# Patient Record
Sex: MALE | Race: BLACK OR AFRICAN AMERICAN | NOT HISPANIC OR LATINO | URBAN - METROPOLITAN AREA
[De-identification: names, ages, dates, MRNs, and addresses within clinical notes are randomized per-mention and may not be internally consistent; named-entity substitution may affect disease eponyms.]

---

## 2017-02-20 ENCOUNTER — EMERGENCY (EMERGENCY)
Facility: HOSPITAL | Age: 82
LOS: 1 days | Discharge: HOME | End: 2017-02-20
Attending: EMERGENCY MEDICINE | Admitting: EMERGENCY MEDICINE
Payer: MEDICARE

## 2017-02-20 VITALS
SYSTOLIC BLOOD PRESSURE: 167 MMHG | TEMPERATURE: 98 F | HEART RATE: 68 BPM | DIASTOLIC BLOOD PRESSURE: 68 MMHG | OXYGEN SATURATION: 97 % | RESPIRATION RATE: 20 BRPM

## 2017-02-20 DIAGNOSIS — S31.010A LACERATION WITHOUT FOREIGN BODY OF LOWER BACK AND PELVIS WITHOUT PENETRATION INTO RETROPERITONEUM, INITIAL ENCOUNTER: ICD-10-CM

## 2017-02-20 DIAGNOSIS — S31.000A UNSPECIFIED OPEN WOUND OF LOWER BACK AND PELVIS WITHOUT PENETRATION INTO RETROPERITONEUM, INITIAL ENCOUNTER: ICD-10-CM

## 2017-02-20 DIAGNOSIS — I10 ESSENTIAL (PRIMARY) HYPERTENSION: ICD-10-CM

## 2017-02-20 DIAGNOSIS — E78.5 HYPERLIPIDEMIA, UNSPECIFIED: ICD-10-CM

## 2017-02-20 DIAGNOSIS — E11.9 TYPE 2 DIABETES MELLITUS WITHOUT COMPLICATIONS: ICD-10-CM

## 2017-02-20 PROCEDURE — 99283 EMERGENCY DEPT VISIT LOW MDM: CPT

## 2017-02-20 NOTE — ED PROCEDURE NOTE - PROCEDURE ADDITIONAL DETAILS
3cc 1% lido with epi injected around skin tag and removed with scissors. Dressed with xeroform and guaze. Justus Quintanilla, Resident. 3cc 1% lido with epi injected around excess skin and removed with scissors. Dressed with xeroform and guaze. Justus Quintanilla, Resident.

## 2017-02-20 NOTE — ED PROVIDER NOTE - ATTENDING CONTRIBUTION TO CARE
81 yom pw laceration to lower back, after accidental tearing off of skin tag.  no other complaints.      agree w/ resident, nad, no sign of cellulitis on exam or evidence of abscess, will lac repair. 81 yom pw laceration to lower back, after accidental tearing off of skin tag.  no other complaints.      agree w/ resident, nad, no sign of cellulitis on exam or evidence of abscess, excess skin noted w/ lac, will lac repair. 81 yom pw bleeding from lower back, after accidental tearing off of skin tag.  no other complaints.      agree w/ resident, nad, no sign of cellulitis on exam or evidence of abscess, excess skin noted, will likely remove excess skin w/ wound care

## 2017-02-20 NOTE — ED PROVIDER NOTE - MEDICAL DECISION MAKING DETAILS
81M presents with avulsion of mole while placing pants on today. Hemostatic. Asymptomatic. PE s/f ctab, rrr, 1.5cm avulsion area from mole with partial attachment to skin. Will remove and dress. Justus Quintanilla, Resident.

## 2017-02-20 NOTE — ED ADULT TRIAGE NOTE - CHIEF COMPLAINT QUOTE
Patient has mole that came off from lower back while putting his pants on. Small cut noted. Not actively bleeding.

## 2017-02-20 NOTE — ED PROVIDER NOTE - OBJECTIVE STATEMENT
81M history of lung and prostate CA, HTN/HLD, DM presents with bleeding from mole that tore off while he was putting his pants today. Denies chest pain, shortness of breath, abdominal pain, nausea/vomiting, fevers/chills, bowel/bladder changes, AC use.

## 2017-07-06 PROBLEM — Z00.00 ENCOUNTER FOR PREVENTIVE HEALTH EXAMINATION: Status: ACTIVE | Noted: 2017-07-06

## 2017-07-13 ENCOUNTER — OUTPATIENT (OUTPATIENT)
Dept: OUTPATIENT SERVICES | Facility: HOSPITAL | Age: 82
LOS: 1 days | End: 2017-07-13

## 2017-07-13 ENCOUNTER — APPOINTMENT (OUTPATIENT)
Dept: MRI IMAGING | Facility: CLINIC | Age: 82
End: 2017-07-13

## 2018-03-05 ENCOUNTER — EMERGENCY (EMERGENCY)
Facility: HOSPITAL | Age: 83
LOS: 1 days | Discharge: HOME | End: 2018-03-05
Attending: EMERGENCY MEDICINE | Admitting: EMERGENCY MEDICINE
Payer: MEDICARE

## 2018-03-05 VITALS
HEART RATE: 97 BPM | OXYGEN SATURATION: 99 % | TEMPERATURE: 97 F | SYSTOLIC BLOOD PRESSURE: 156 MMHG | RESPIRATION RATE: 18 BRPM | WEIGHT: 151.9 LBS | DIASTOLIC BLOOD PRESSURE: 52 MMHG

## 2018-03-05 PROCEDURE — 71046 X-RAY EXAM CHEST 2 VIEWS: CPT | Mod: 26

## 2018-03-05 PROCEDURE — 99284 EMERGENCY DEPT VISIT MOD MDM: CPT

## 2018-03-05 RX ORDER — IPRATROPIUM/ALBUTEROL SULFATE 18-103MCG
3 AEROSOL WITH ADAPTER (GRAM) INHALATION
Qty: 0 | Refills: 0 | Status: COMPLETED | OUTPATIENT
Start: 2018-03-05 | End: 2018-03-05

## 2018-03-05 RX ORDER — AZITHROMYCIN 500 MG/1
1 TABLET, FILM COATED ORAL
Qty: 3 | Refills: 0
Start: 2018-03-05 | End: 2018-03-07

## 2018-03-05 RX ORDER — ALBUTEROL 90 UG/1
2 AEROSOL, METERED ORAL
Qty: 1 | Refills: 0
Start: 2018-03-05 | End: 2018-04-03

## 2018-03-05 RX ADMIN — Medication 3 MILLILITER(S): at 15:57

## 2018-03-05 RX ADMIN — Medication 60 MILLIGRAM(S): at 15:15

## 2018-03-05 RX ADMIN — Medication 3 MILLILITER(S): at 15:15

## 2018-03-05 NOTE — ED PROVIDER NOTE - OBJECTIVE STATEMENT
Pt is an 81 y/o M, with PMHx of prostate CA, presenting to the ED with c/o cough, onset three days. Pt reports productive cough with white sputum production. Denies associated F/C, SOB, CP, and back pain. NO recent sick contacts. Pt has been eating and drinking at baseline.

## 2018-03-05 NOTE — ED PROVIDER NOTE - DIAGNOSTIC INTERPRETATION
CXR no acute infiltrate, heart shadow normal, bony structures intact, ?mass R LUNG, NO RADIOLOGY READING AT THIS TIME

## 2018-03-05 NOTE — ED PROVIDER NOTE - PROGRESS NOTE DETAILS
pt with significantly improved res sx, lungs CTA no wheezing, ambulatory without return of wheezing, stable for dc home, didn't want to wait for radiology report, phone number verified will f/u with his PMD

## 2018-03-09 DIAGNOSIS — R05 COUGH: ICD-10-CM

## 2018-03-09 DIAGNOSIS — J20.9 ACUTE BRONCHITIS, UNSPECIFIED: ICD-10-CM

## 2019-07-12 ENCOUNTER — INPATIENT (INPATIENT)
Facility: HOSPITAL | Age: 84
LOS: 4 days | Discharge: HOME | DRG: 872 | End: 2019-07-17
Payer: MEDICARE

## 2019-07-12 VITALS
OXYGEN SATURATION: 91 % | WEIGHT: 154.98 LBS | DIASTOLIC BLOOD PRESSURE: 76 MMHG | RESPIRATION RATE: 18 BRPM | HEART RATE: 104 BPM | TEMPERATURE: 100 F | SYSTOLIC BLOOD PRESSURE: 172 MMHG

## 2019-07-12 PROBLEM — C61 MALIGNANT NEOPLASM OF PROSTATE: Chronic | Status: ACTIVE | Noted: 2018-03-05

## 2019-07-12 LAB
ALBUMIN SERPL ELPH-MCNC: 2.9 G/DL — LOW (ref 3.4–5)
ALP SERPL-CCNC: 197 U/L — HIGH (ref 40–120)
ALT FLD-CCNC: 9 U/L — LOW (ref 12–42)
ANION GAP SERPL CALC-SCNC: 11 MMOL/L — SIGNIFICANT CHANGE UP (ref 9–16)
APPEARANCE UR: CLEAR — SIGNIFICANT CHANGE UP
AST SERPL-CCNC: 26 U/L — SIGNIFICANT CHANGE UP (ref 15–37)
BASOPHILS NFR BLD AUTO: 0.1 % — SIGNIFICANT CHANGE UP (ref 0–2)
BILIRUB SERPL-MCNC: 0.7 MG/DL — SIGNIFICANT CHANGE UP (ref 0.2–1.2)
BILIRUB UR-MCNC: NEGATIVE — SIGNIFICANT CHANGE UP
BUN SERPL-MCNC: 13 MG/DL — SIGNIFICANT CHANGE UP (ref 7–23)
CALCIUM SERPL-MCNC: 9.7 MG/DL — SIGNIFICANT CHANGE UP (ref 8.5–10.5)
CHLORIDE SERPL-SCNC: 99 MMOL/L — SIGNIFICANT CHANGE UP (ref 96–108)
CO2 SERPL-SCNC: 29 MMOL/L — SIGNIFICANT CHANGE UP (ref 22–31)
COLOR SPEC: YELLOW — SIGNIFICANT CHANGE UP
CREAT SERPL-MCNC: 1.23 MG/DL — SIGNIFICANT CHANGE UP (ref 0.5–1.3)
DIFF PNL FLD: ABNORMAL
EOSINOPHIL NFR BLD AUTO: 1.1 % — SIGNIFICANT CHANGE UP (ref 0–6)
EPI CELLS # UR: SIGNIFICANT CHANGE UP /HPF (ref 0–5)
GLUCOSE SERPL-MCNC: 255 MG/DL — HIGH (ref 70–99)
GLUCOSE UR QL: 500
HCT VFR BLD CALC: 32.9 % — LOW (ref 39–50)
HGB BLD-MCNC: 11 G/DL — LOW (ref 13–17)
IMM GRANULOCYTES NFR BLD AUTO: 0.5 % — SIGNIFICANT CHANGE UP (ref 0–1.5)
KETONES UR-MCNC: ABNORMAL MG/DL
LACTATE SERPL-SCNC: 1.8 MMOL/L — SIGNIFICANT CHANGE UP (ref 0.4–2)
LEUKOCYTE ESTERASE UR-ACNC: NEGATIVE — SIGNIFICANT CHANGE UP
LYMPHOCYTES # BLD AUTO: 14.2 % — SIGNIFICANT CHANGE UP (ref 13–44)
MCHC RBC-ENTMCNC: 29.6 PG — SIGNIFICANT CHANGE UP (ref 27–34)
MCHC RBC-ENTMCNC: 33.4 G/DL — SIGNIFICANT CHANGE UP (ref 32–36)
MCV RBC AUTO: 88.4 FL — SIGNIFICANT CHANGE UP (ref 80–100)
MONOCYTES NFR BLD AUTO: 6.1 % — SIGNIFICANT CHANGE UP (ref 2–14)
NEUTROPHILS NFR BLD AUTO: 78 % — HIGH (ref 43–77)
NITRITE UR-MCNC: NEGATIVE — SIGNIFICANT CHANGE UP
NT-PROBNP SERPL-SCNC: 1618 PG/ML — HIGH
PCO2 BLDA: 42 MMHG — SIGNIFICANT CHANGE UP (ref 35–48)
PH BLDA: 7.43 — SIGNIFICANT CHANGE UP (ref 7.35–7.45)
PH UR: 7 — SIGNIFICANT CHANGE UP (ref 5–8)
PLATELET # BLD AUTO: 237 K/UL — SIGNIFICANT CHANGE UP (ref 150–400)
PO2 BLDA: 80 MMHG — LOW (ref 83–108)
POTASSIUM SERPL-MCNC: 3.9 MMOL/L — SIGNIFICANT CHANGE UP (ref 3.5–5.3)
POTASSIUM SERPL-SCNC: 3.9 MMOL/L — SIGNIFICANT CHANGE UP (ref 3.5–5.3)
PROT SERPL-MCNC: 7.5 G/DL — SIGNIFICANT CHANGE UP (ref 6.4–8.2)
PROT UR-MCNC: 30 MG/DL
RBC # BLD: 3.72 M/UL — LOW (ref 4.2–5.8)
RBC # FLD: 14.2 % — SIGNIFICANT CHANGE UP (ref 10.3–14.5)
SAO2 % BLDA: 96 % — SIGNIFICANT CHANGE UP (ref 95–100)
SODIUM SERPL-SCNC: 139 MMOL/L — SIGNIFICANT CHANGE UP (ref 132–145)
SP GR SPEC: 1.01 — SIGNIFICANT CHANGE UP (ref 1–1.03)
TROPONIN I SERPL-MCNC: <0.017 NG/ML — LOW (ref 0.02–0.06)
UROBILINOGEN FLD QL: 0.2 E.U./DL — SIGNIFICANT CHANGE UP
WBC # BLD: 9.8 K/UL — SIGNIFICANT CHANGE UP (ref 3.8–10.5)
WBC # FLD AUTO: 9.8 K/UL — SIGNIFICANT CHANGE UP (ref 3.8–10.5)
WBC UR QL: < 5 /HPF — SIGNIFICANT CHANGE UP

## 2019-07-12 PROCEDURE — 71045 X-RAY EXAM CHEST 1 VIEW: CPT | Mod: 26

## 2019-07-12 PROCEDURE — 99285 EMERGENCY DEPT VISIT HI MDM: CPT | Mod: 25

## 2019-07-12 PROCEDURE — 74177 CT ABD & PELVIS W/CONTRAST: CPT | Mod: 26

## 2019-07-12 PROCEDURE — 93010 ELECTROCARDIOGRAM REPORT: CPT

## 2019-07-12 PROCEDURE — 99223 1ST HOSP IP/OBS HIGH 75: CPT | Mod: GC

## 2019-07-12 RX ORDER — SODIUM CHLORIDE 9 MG/ML
2200 INJECTION INTRAMUSCULAR; INTRAVENOUS; SUBCUTANEOUS ONCE
Refills: 0 | Status: COMPLETED | OUTPATIENT
Start: 2019-07-12 | End: 2019-07-12

## 2019-07-12 RX ORDER — MORPHINE SULFATE 50 MG/1
4 CAPSULE, EXTENDED RELEASE ORAL ONCE
Refills: 0 | Status: DISCONTINUED | OUTPATIENT
Start: 2019-07-12 | End: 2019-07-12

## 2019-07-12 RX ORDER — ONDANSETRON 8 MG/1
4 TABLET, FILM COATED ORAL ONCE
Refills: 0 | Status: COMPLETED | OUTPATIENT
Start: 2019-07-12 | End: 2019-07-12

## 2019-07-12 RX ORDER — SIMVASTATIN 20 MG/1
1 TABLET, FILM COATED ORAL
Qty: 0 | Refills: 0 | DISCHARGE

## 2019-07-12 RX ORDER — METOPROLOL TARTRATE 50 MG
0 TABLET ORAL
Qty: 0 | Refills: 0 | DISCHARGE

## 2019-07-12 RX ORDER — ACETAMINOPHEN 500 MG
650 TABLET ORAL ONCE
Refills: 0 | Status: DISCONTINUED | OUTPATIENT
Start: 2019-07-12 | End: 2019-07-12

## 2019-07-12 RX ORDER — IPRATROPIUM/ALBUTEROL SULFATE 18-103MCG
3 AEROSOL WITH ADAPTER (GRAM) INHALATION ONCE
Refills: 0 | Status: COMPLETED | OUTPATIENT
Start: 2019-07-12 | End: 2019-07-12

## 2019-07-12 RX ORDER — ACETAMINOPHEN 500 MG
650 TABLET ORAL ONCE
Refills: 0 | Status: COMPLETED | OUTPATIENT
Start: 2019-07-12 | End: 2019-07-12

## 2019-07-12 RX ORDER — LEVOTHYROXINE SODIUM 125 MCG
0 TABLET ORAL
Qty: 0 | Refills: 0 | DISCHARGE

## 2019-07-12 RX ORDER — METFORMIN HYDROCHLORIDE 850 MG/1
0 TABLET ORAL
Qty: 0 | Refills: 0 | DISCHARGE

## 2019-07-12 RX ORDER — AMLODIPINE BESYLATE 2.5 MG/1
1 TABLET ORAL
Qty: 0 | Refills: 0 | DISCHARGE

## 2019-07-12 RX ORDER — TAMSULOSIN HYDROCHLORIDE 0.4 MG/1
1 CAPSULE ORAL
Qty: 0 | Refills: 0 | DISCHARGE

## 2019-07-12 RX ORDER — FUROSEMIDE 40 MG
20 TABLET ORAL ONCE
Refills: 0 | Status: COMPLETED | OUTPATIENT
Start: 2019-07-12 | End: 2019-07-12

## 2019-07-12 RX ORDER — MORPHINE SULFATE 50 MG/1
2 CAPSULE, EXTENDED RELEASE ORAL ONCE
Refills: 0 | Status: DISCONTINUED | OUTPATIENT
Start: 2019-07-12 | End: 2019-07-12

## 2019-07-12 RX ORDER — CEFTRIAXONE 500 MG/1
1000 INJECTION, POWDER, FOR SOLUTION INTRAMUSCULAR; INTRAVENOUS ONCE
Refills: 0 | Status: COMPLETED | OUTPATIENT
Start: 2019-07-12 | End: 2019-07-12

## 2019-07-12 RX ADMIN — MORPHINE SULFATE 4 MILLIGRAM(S): 50 CAPSULE, EXTENDED RELEASE ORAL at 09:56

## 2019-07-12 RX ADMIN — Medication 650 MILLIGRAM(S): at 10:31

## 2019-07-12 RX ADMIN — CEFTRIAXONE 100 MILLIGRAM(S): 500 INJECTION, POWDER, FOR SOLUTION INTRAMUSCULAR; INTRAVENOUS at 10:54

## 2019-07-12 RX ADMIN — Medication 20 MILLIGRAM(S): at 17:01

## 2019-07-12 RX ADMIN — MORPHINE SULFATE 4 MILLIGRAM(S): 50 CAPSULE, EXTENDED RELEASE ORAL at 17:01

## 2019-07-12 RX ADMIN — ONDANSETRON 4 MILLIGRAM(S): 8 TABLET, FILM COATED ORAL at 09:56

## 2019-07-12 RX ADMIN — Medication 3 MILLILITER(S): at 10:31

## 2019-07-12 RX ADMIN — SODIUM CHLORIDE 2200 MILLILITER(S): 9 INJECTION INTRAMUSCULAR; INTRAVENOUS; SUBCUTANEOUS at 09:58

## 2019-07-12 NOTE — ED ADULT NURSE REASSESSMENT NOTE - NS ED NURSE REASSESS COMMENT FT1
pt was a hard stick. this rn attempted 4 times. able to get IV access, but blood was dripping slow. labs sent at this time, charge rn able to butterfly pt for labs. blood cultures sent at this time, which is why ABT was delayed. must draw blood cultures prior to receiving abt.

## 2019-07-12 NOTE — ED PROVIDER NOTE - CARE PLAN
Principal Discharge DX:	Abdominal pain Principal Discharge DX:	Abdominal pain  Secondary Diagnosis:	Intractable pain  Secondary Diagnosis:	Fever

## 2019-07-12 NOTE — ED ADULT TRIAGE NOTE - CHIEF COMPLAINT QUOTE
here for lower abdominal pain, nausea and vomiting - states he hasn't taken any of his meds since tuesday

## 2019-07-12 NOTE — ED PROVIDER NOTE - PROGRESS NOTE DETAILS
on arrival sepsis work up done since pt has fever, abd pain, tachy. Labs unremarkable except for hyperglycemia. UA no signs of infection. CT abd concerning for necrotic masses, maybe related to progression of prostate CA? Several rounds of pain meds required for pain control. Also pt admits to constipation recently so he had some milk of magnesia that triggered the diarrhea episode here, it seems. Also component of hypoxia, low 90s, unclear etiology, mild bump in BNP, maybe fluid overload? other wise normal trop/EKG and CXR. No clear source of infection at this time. Presented case for admission to [facility] IM, Dr Tinajeor, pt currently on O2 NC, stable for admision to [facility]. Primary care at [facility], Dr Nicolas Grier

## 2019-07-12 NOTE — ED PROVIDER NOTE - CLINICAL SUMMARY MEDICAL DECISION MAKING FREE TEXT BOX
Pt w abd pain, diarrhea, fever, sepsis protocol started. Castillo cultured, NS bolus as per protocol, broad spectrum abx dose. Stool cultures including Cdiff.

## 2019-07-12 NOTE — ED ADULT NURSE NOTE - NSIMPLEMENTINTERV_GEN_ALL_ED
Implemented All Universal Safety Interventions:  [place] to call system. Call bell, personal items and telephone within reach. Instruct patient to call for assistance. Room bathroom lighting operational. Non-slip footwear when patient is off stretcher. Physically safe environment: no spills, clutter or unnecessary equipment. Stretcher in lowest position, wheels locked, appropriate side rails in place.

## 2019-07-12 NOTE — ED PROVIDER NOTE - GASTROINTESTINAL, MLM
difuse lower abd tenderness moderate and mild in the upper abd, soft and depressible without peritoneal signs

## 2019-07-13 DIAGNOSIS — E78.5 HYPERLIPIDEMIA, UNSPECIFIED: ICD-10-CM

## 2019-07-13 DIAGNOSIS — A41.9 SEPSIS, UNSPECIFIED ORGANISM: ICD-10-CM

## 2019-07-13 DIAGNOSIS — Z98.890 OTHER SPECIFIED POSTPROCEDURAL STATES: Chronic | ICD-10-CM

## 2019-07-13 DIAGNOSIS — R19.7 DIARRHEA, UNSPECIFIED: ICD-10-CM

## 2019-07-13 DIAGNOSIS — C61 MALIGNANT NEOPLASM OF PROSTATE: ICD-10-CM

## 2019-07-13 DIAGNOSIS — R59.0 LOCALIZED ENLARGED LYMPH NODES: ICD-10-CM

## 2019-07-13 DIAGNOSIS — I10 ESSENTIAL (PRIMARY) HYPERTENSION: ICD-10-CM

## 2019-07-13 DIAGNOSIS — R63.8 OTHER SYMPTOMS AND SIGNS CONCERNING FOOD AND FLUID INTAKE: ICD-10-CM

## 2019-07-13 DIAGNOSIS — Z91.89 OTHER SPECIFIED PERSONAL RISK FACTORS, NOT ELSEWHERE CLASSIFIED: ICD-10-CM

## 2019-07-13 DIAGNOSIS — E11.9 TYPE 2 DIABETES MELLITUS WITHOUT COMPLICATIONS: ICD-10-CM

## 2019-07-13 LAB
ALBUMIN SERPL ELPH-MCNC: 3.2 G/DL — LOW (ref 3.3–5)
ALP SERPL-CCNC: 148 U/L — HIGH (ref 40–120)
ALT FLD-CCNC: 6 U/L — LOW (ref 10–45)
ANION GAP SERPL CALC-SCNC: 11 MMOL/L — SIGNIFICANT CHANGE UP (ref 5–17)
AST SERPL-CCNC: 17 U/L — SIGNIFICANT CHANGE UP (ref 10–40)
BASOPHILS # BLD AUTO: 0.01 K/UL — SIGNIFICANT CHANGE UP (ref 0–0.2)
BASOPHILS NFR BLD AUTO: 0.1 % — SIGNIFICANT CHANGE UP (ref 0–2)
BILIRUB SERPL-MCNC: 0.5 MG/DL — SIGNIFICANT CHANGE UP (ref 0.2–1.2)
BLD GP AB SCN SERPL QL: NEGATIVE — SIGNIFICANT CHANGE UP
BUN SERPL-MCNC: 11 MG/DL — SIGNIFICANT CHANGE UP (ref 7–23)
C DIFF BY PCR RESULT: SIGNIFICANT CHANGE UP
C DIFF TOX GENS STL QL NAA+PROBE: SIGNIFICANT CHANGE UP
CALCIUM SERPL-MCNC: 8.8 MG/DL — SIGNIFICANT CHANGE UP (ref 8.4–10.5)
CHLORIDE SERPL-SCNC: 104 MMOL/L — SIGNIFICANT CHANGE UP (ref 96–108)
CO2 SERPL-SCNC: 27 MMOL/L — SIGNIFICANT CHANGE UP (ref 22–31)
CREAT SERPL-MCNC: 1.33 MG/DL — HIGH (ref 0.5–1.3)
CULTURE RESULTS: SIGNIFICANT CHANGE UP
EOSINOPHIL # BLD AUTO: 0.1 K/UL — SIGNIFICANT CHANGE UP (ref 0–0.5)
EOSINOPHIL NFR BLD AUTO: 1.1 % — SIGNIFICANT CHANGE UP (ref 0–6)
GLUCOSE BLDC GLUCOMTR-MCNC: 133 MG/DL — HIGH (ref 70–99)
GLUCOSE BLDC GLUCOMTR-MCNC: 162 MG/DL — HIGH (ref 70–99)
GLUCOSE BLDC GLUCOMTR-MCNC: 190 MG/DL — HIGH (ref 70–99)
GLUCOSE BLDC GLUCOMTR-MCNC: 209 MG/DL — HIGH (ref 70–99)
GLUCOSE SERPL-MCNC: 214 MG/DL — HIGH (ref 70–99)
HBA1C BLD-MCNC: 9.5 % — HIGH (ref 4–5.6)
HCT VFR BLD CALC: 30.2 % — LOW (ref 39–50)
HGB BLD-MCNC: 9.4 G/DL — LOW (ref 13–17)
IMM GRANULOCYTES NFR BLD AUTO: 0.6 % — SIGNIFICANT CHANGE UP (ref 0–1.5)
LYMPHOCYTES # BLD AUTO: 0.74 K/UL — LOW (ref 1–3.3)
LYMPHOCYTES # BLD AUTO: 4 % — LOW (ref 13–44)
LYMPHOCYTES # BLD AUTO: 8.5 % — LOW (ref 13–44)
MAGNESIUM SERPL-MCNC: 2.2 MG/DL — SIGNIFICANT CHANGE UP (ref 1.6–2.6)
MANUAL SMEAR VERIFICATION: SIGNIFICANT CHANGE UP
MCHC RBC-ENTMCNC: 29.2 PG — SIGNIFICANT CHANGE UP (ref 27–34)
MCHC RBC-ENTMCNC: 31.1 GM/DL — LOW (ref 32–36)
MCV RBC AUTO: 93.8 FL — SIGNIFICANT CHANGE UP (ref 80–100)
MONOCYTES # BLD AUTO: 0.66 K/UL — SIGNIFICANT CHANGE UP (ref 0–0.9)
MONOCYTES NFR BLD AUTO: 7 % — SIGNIFICANT CHANGE UP (ref 2–14)
MONOCYTES NFR BLD AUTO: 7.6 % — SIGNIFICANT CHANGE UP (ref 2–14)
NEUTROPHILS # BLD AUTO: 7.18 K/UL — SIGNIFICANT CHANGE UP (ref 1.8–7.4)
NEUTROPHILS NFR BLD AUTO: 82.1 % — HIGH (ref 43–77)
NEUTROPHILS NFR BLD AUTO: 89 % — HIGH (ref 43–77)
NRBC # BLD: 0 /100 WBCS — SIGNIFICANT CHANGE UP (ref 0–0)
PHOSPHATE SERPL-MCNC: 3.9 MG/DL — SIGNIFICANT CHANGE UP (ref 2.5–4.5)
PLAT MORPH BLD: NORMAL — SIGNIFICANT CHANGE UP
PLATELET # BLD AUTO: 230 K/UL — SIGNIFICANT CHANGE UP (ref 150–400)
POTASSIUM SERPL-MCNC: 4 MMOL/L — SIGNIFICANT CHANGE UP (ref 3.5–5.3)
POTASSIUM SERPL-SCNC: 4 MMOL/L — SIGNIFICANT CHANGE UP (ref 3.5–5.3)
PROT SERPL-MCNC: 6.8 G/DL — SIGNIFICANT CHANGE UP (ref 6–8.3)
RBC # BLD: 3.22 M/UL — LOW (ref 4.2–5.8)
RBC # FLD: 14.2 % — SIGNIFICANT CHANGE UP (ref 10.3–14.5)
RBC BLD AUTO: NORMAL — SIGNIFICANT CHANGE UP
RH IG SCN BLD-IMP: POSITIVE — SIGNIFICANT CHANGE UP
SODIUM SERPL-SCNC: 142 MMOL/L — SIGNIFICANT CHANGE UP (ref 135–145)
SPECIMEN SOURCE: SIGNIFICANT CHANGE UP
TROPONIN T SERPL-MCNC: 0.02 NG/ML — HIGH (ref 0–0.01)
TROPONIN T SERPL-MCNC: 0.03 NG/ML — HIGH (ref 0–0.01)
WBC # BLD: 8.74 K/UL — SIGNIFICANT CHANGE UP (ref 3.8–10.5)
WBC # FLD AUTO: 8.74 K/UL — SIGNIFICANT CHANGE UP (ref 3.8–10.5)

## 2019-07-13 PROCEDURE — 99233 SBSQ HOSP IP/OBS HIGH 50: CPT | Mod: GC

## 2019-07-13 RX ORDER — SODIUM CHLORIDE 9 MG/ML
1000 INJECTION, SOLUTION INTRAVENOUS
Refills: 0 | Status: DISCONTINUED | OUTPATIENT
Start: 2019-07-13 | End: 2019-07-17

## 2019-07-13 RX ORDER — GLUCAGON INJECTION, SOLUTION 0.5 MG/.1ML
1 INJECTION, SOLUTION SUBCUTANEOUS ONCE
Refills: 0 | Status: DISCONTINUED | OUTPATIENT
Start: 2019-07-13 | End: 2019-07-17

## 2019-07-13 RX ORDER — ACETAMINOPHEN 500 MG
650 TABLET ORAL EVERY 6 HOURS
Refills: 0 | Status: DISCONTINUED | OUTPATIENT
Start: 2019-07-13 | End: 2019-07-17

## 2019-07-13 RX ORDER — INSULIN LISPRO 100/ML
VIAL (ML) SUBCUTANEOUS
Refills: 0 | Status: DISCONTINUED | OUTPATIENT
Start: 2019-07-13 | End: 2019-07-17

## 2019-07-13 RX ORDER — SENNA PLUS 8.6 MG/1
2 TABLET ORAL AT BEDTIME
Refills: 0 | Status: DISCONTINUED | OUTPATIENT
Start: 2019-07-13 | End: 2019-07-17

## 2019-07-13 RX ORDER — AMLODIPINE BESYLATE 2.5 MG/1
10 TABLET ORAL DAILY
Refills: 0 | Status: DISCONTINUED | OUTPATIENT
Start: 2019-07-14 | End: 2019-07-17

## 2019-07-13 RX ORDER — SODIUM CHLORIDE 9 MG/ML
1000 INJECTION INTRAMUSCULAR; INTRAVENOUS; SUBCUTANEOUS
Refills: 0 | Status: DISCONTINUED | OUTPATIENT
Start: 2019-07-13 | End: 2019-07-14

## 2019-07-13 RX ORDER — AMLODIPINE BESYLATE 2.5 MG/1
10 TABLET ORAL DAILY
Refills: 0 | Status: DISCONTINUED | OUTPATIENT
Start: 2019-07-13 | End: 2019-07-13

## 2019-07-13 RX ORDER — SIMVASTATIN 20 MG/1
20 TABLET, FILM COATED ORAL AT BEDTIME
Refills: 0 | Status: DISCONTINUED | OUTPATIENT
Start: 2019-07-13 | End: 2019-07-17

## 2019-07-13 RX ORDER — DOCUSATE SODIUM 100 MG
100 CAPSULE ORAL DAILY
Refills: 0 | Status: DISCONTINUED | OUTPATIENT
Start: 2019-07-13 | End: 2019-07-17

## 2019-07-13 RX ORDER — HEPARIN SODIUM 5000 [USP'U]/ML
5000 INJECTION INTRAVENOUS; SUBCUTANEOUS EVERY 8 HOURS
Refills: 0 | Status: DISCONTINUED | OUTPATIENT
Start: 2019-07-13 | End: 2019-07-14

## 2019-07-13 RX ORDER — METOPROLOL TARTRATE 50 MG
25 TABLET ORAL
Refills: 0 | Status: DISCONTINUED | OUTPATIENT
Start: 2019-07-13 | End: 2019-07-17

## 2019-07-13 RX ORDER — TAMSULOSIN HYDROCHLORIDE 0.4 MG/1
0.4 CAPSULE ORAL AT BEDTIME
Refills: 0 | Status: DISCONTINUED | OUTPATIENT
Start: 2019-07-13 | End: 2019-07-15

## 2019-07-13 RX ORDER — PIPERACILLIN AND TAZOBACTAM 4; .5 G/20ML; G/20ML
3.38 INJECTION, POWDER, LYOPHILIZED, FOR SOLUTION INTRAVENOUS EVERY 6 HOURS
Refills: 0 | Status: DISCONTINUED | OUTPATIENT
Start: 2019-07-13 | End: 2019-07-15

## 2019-07-13 RX ORDER — LEVOTHYROXINE SODIUM 125 MCG
37.5 TABLET ORAL DAILY
Refills: 0 | Status: DISCONTINUED | OUTPATIENT
Start: 2019-07-13 | End: 2019-07-17

## 2019-07-13 RX ORDER — SODIUM CHLORIDE 9 MG/ML
1000 INJECTION INTRAMUSCULAR; INTRAVENOUS; SUBCUTANEOUS
Refills: 0 | Status: DISCONTINUED | OUTPATIENT
Start: 2019-07-13 | End: 2019-07-13

## 2019-07-13 RX ORDER — DEXTROSE 50 % IN WATER 50 %
12.5 SYRINGE (ML) INTRAVENOUS ONCE
Refills: 0 | Status: DISCONTINUED | OUTPATIENT
Start: 2019-07-13 | End: 2019-07-17

## 2019-07-13 RX ORDER — OXYCODONE AND ACETAMINOPHEN 5; 325 MG/1; MG/1
1 TABLET ORAL EVERY 6 HOURS
Refills: 0 | Status: DISCONTINUED | OUTPATIENT
Start: 2019-07-13 | End: 2019-07-15

## 2019-07-13 RX ORDER — DEXTROSE 50 % IN WATER 50 %
15 SYRINGE (ML) INTRAVENOUS ONCE
Refills: 0 | Status: DISCONTINUED | OUTPATIENT
Start: 2019-07-13 | End: 2019-07-17

## 2019-07-13 RX ADMIN — HEPARIN SODIUM 5000 UNIT(S): 5000 INJECTION INTRAVENOUS; SUBCUTANEOUS at 06:44

## 2019-07-13 RX ADMIN — Medication 25 MILLIGRAM(S): at 17:12

## 2019-07-13 RX ADMIN — SIMVASTATIN 20 MILLIGRAM(S): 20 TABLET, FILM COATED ORAL at 21:56

## 2019-07-13 RX ADMIN — Medication 4: at 09:19

## 2019-07-13 RX ADMIN — OXYCODONE AND ACETAMINOPHEN 1 TABLET(S): 5; 325 TABLET ORAL at 09:19

## 2019-07-13 RX ADMIN — Medication 25 MILLIGRAM(S): at 06:44

## 2019-07-13 RX ADMIN — OXYCODONE AND ACETAMINOPHEN 1 TABLET(S): 5; 325 TABLET ORAL at 09:49

## 2019-07-13 RX ADMIN — SENNA PLUS 2 TABLET(S): 8.6 TABLET ORAL at 21:56

## 2019-07-13 RX ADMIN — Medication 2: at 11:21

## 2019-07-13 RX ADMIN — Medication 2: at 17:11

## 2019-07-13 RX ADMIN — PIPERACILLIN AND TAZOBACTAM 200 GRAM(S): 4; .5 INJECTION, POWDER, LYOPHILIZED, FOR SOLUTION INTRAVENOUS at 00:59

## 2019-07-13 RX ADMIN — HEPARIN SODIUM 5000 UNIT(S): 5000 INJECTION INTRAVENOUS; SUBCUTANEOUS at 13:34

## 2019-07-13 RX ADMIN — PIPERACILLIN AND TAZOBACTAM 200 GRAM(S): 4; .5 INJECTION, POWDER, LYOPHILIZED, FOR SOLUTION INTRAVENOUS at 06:44

## 2019-07-13 RX ADMIN — TAMSULOSIN HYDROCHLORIDE 0.4 MILLIGRAM(S): 0.4 CAPSULE ORAL at 21:56

## 2019-07-13 RX ADMIN — PIPERACILLIN AND TAZOBACTAM 200 GRAM(S): 4; .5 INJECTION, POWDER, LYOPHILIZED, FOR SOLUTION INTRAVENOUS at 17:12

## 2019-07-13 RX ADMIN — OXYCODONE AND ACETAMINOPHEN 1 TABLET(S): 5; 325 TABLET ORAL at 17:42

## 2019-07-13 RX ADMIN — Medication 37.5 MICROGRAM(S): at 06:44

## 2019-07-13 RX ADMIN — PIPERACILLIN AND TAZOBACTAM 200 GRAM(S): 4; .5 INJECTION, POWDER, LYOPHILIZED, FOR SOLUTION INTRAVENOUS at 11:21

## 2019-07-13 RX ADMIN — OXYCODONE AND ACETAMINOPHEN 1 TABLET(S): 5; 325 TABLET ORAL at 02:45

## 2019-07-13 RX ADMIN — HEPARIN SODIUM 5000 UNIT(S): 5000 INJECTION INTRAVENOUS; SUBCUTANEOUS at 21:56

## 2019-07-13 RX ADMIN — OXYCODONE AND ACETAMINOPHEN 1 TABLET(S): 5; 325 TABLET ORAL at 17:12

## 2019-07-13 NOTE — PROGRESS NOTE ADULT - ASSESSMENT
Patient is an 85 y/o male w hx of metastatic prostate cancer (on chemotherapy), HTN, HLD, DM who presented to [facility] with lower abdominal pain x 2-3 days, febrile in ED and found to have necrotic intraabdominal lymph nodes w fat stranding.

## 2019-07-13 NOTE — H&P ADULT - PROBLEM SELECTOR PROBLEM 7
Transition of care performed with sharing of clinical summary Type 2 diabetes mellitus without complication, without long-term current use of insulin

## 2019-07-13 NOTE — H&P ADULT - NSHPPHYSICALEXAM_GEN_ALL_CORE
VITAL SIGNS:  T(C): 37.3 (07-12-19 @ 23:50), Max: 39.2 (07-12-19 @ 10:27)  T(F): 99.1 (07-12-19 @ 23:50), Max: 102.6 (07-12-19 @ 10:27)  HR: 110 (07-12-19 @ 23:50) (90 - 110)  BP: 149/63 (07-12-19 @ 23:50) (120/60 - 178/78)  BP(mean): --  RR: 18 (07-12-19 @ 23:50) (16 - 18)  SpO2: 100% (07-12-19 @ 23:50) (87% - 100%)  Wt(kg): --  PHYSICAL EXAM:  Constitutional: WDWN resting comfortably in bed; NAD  Head: NC/AT  Eyes: PERRL, EOMI, anicteric sclera  ENT: no nasal discharge; uvula midline, no oropharyngeal erythema or exudates; MMM  Neck: supple; no JVD or thyromegaly  Respiratory: CTA B/L; no W/R/R, no retractions  Cardiac: +S1/S2; RRR; no M/R/G; PMI non-displaced  Gastrointestinal: Slightly distended with RLQ and LLQ tenderness, normal bowel sounds, no masses. No rebound, guarding, or rigidity.   Back: spine midline, no bony tenderness or step-offs; no CVAT B/L  Extremities: WWP, no clubbing or cyanosis; no peripheral edema  Musculoskeletal: NROM x4; no joint swelling, tenderness or erythema  Vascular: 2+ radial, femoral, DP/PT pulses B/L  Dermatologic: skin warm, dry and intact; no rashes, wounds, or scars  Neurologic: AAOx3; CNII-XII grossly intact; no focal deficits  Psychiatric: affect and characteristics of appearance, verbalizations, behaviors are appropriate

## 2019-07-13 NOTE — H&P ADULT - ASSESSMENT
Patient is an 85 y/o male w hx of metastatic prostate cancer (on chemotherapy), HTN, HLD, DM who presented to [facility] with lower abdominal pain x 2-3 days, febrile in ED and found to have necrotic intraabdominal lymph nodes w fat stranding.

## 2019-07-13 NOTE — H&P ADULT - PROBLEM SELECTOR PLAN 7
1.       PCP Contacted on Admission: (Y/N) --> Name & Phone #: No, Dr. Nina Blachman  2.       Date of Contact with PCP:  3.       PCP Contacted at Discharge: (Y/N)  4.       Summary of Handoff Given to PCP:  5.       Post-Discharge Appointment Date and Location: [facility] - On metformin 500mg at home, non insulin dependent  - Moderate insulin sliding scale, consistent carb diet  - A1c in AM

## 2019-07-13 NOTE — H&P ADULT - PROBLEM SELECTOR PLAN 3
- Continue simvastatin 20mg po qd #Watery stool/constipation  - Patient with watery stool after taking milk of magnesia for constipation, constipation likely caused by lymph node inflammation, no stool burden seen on CT. Low suspicion for diarrheal infection, does not have c-diff risk factors, GI studies sent from [facility], will f/u.

## 2019-07-13 NOTE — H&P ADULT - PROBLEM SELECTOR PLAN 6
F: Euvolemic, tolerating PO  E: Replete K>4, Mg>2  N: DASH/TLC/CC  DVTP: HSQ - Continue home amlodipine 10mg po qd, metoprolol 25mg po qd - Continue home amlodipine 10mg po qd, metoprolol 25mg po qd    ADDENDUM: held amlodipine in setting of sepsis, restart prn

## 2019-07-13 NOTE — H&P ADULT - ATTENDING COMMENTS
patient seen and examined  reviewed pertinent data, h&p  pe as above except pt missing LEFT eye (childhood accident);+chapped lips, and mildly dry MM; abdomen slightly distended w/ mild b/l lower abdominal pan   medical decision making : high complexity     1. sepsis in setting of necrotic abdominal LNs, in setting of known prostate cancer w/ mets; pt  started on zosyn, followup ctxs, started on gentle IV hydration overnight ; held amlodipine, restart prn   2. prostate cancer w/ mets: heme/onc and palliative consults      rest of plan as above

## 2019-07-13 NOTE — PROGRESS NOTE ADULT - SUBJECTIVE AND OBJECTIVE BOX
OVERNIGHT EVENTS:    SUBJECTIVE:    Vital Signs Last 12 Hrs  T(F): 97.6 (19 @ 08:40), Max: 99.8 (19 @ 02:34)  HR: 68 (19 @ 08:40) (68 - 110)  BP: 135/62 (19 @ 08:40) (122/63 - 149/63)  BP(mean): --  RR: 18 (19 @ 08:40) (18 - 19)  SpO2: 100% (19 @ 08:40) (95% - 100%)  I&O's Summary    2019 07:01  -  2019 07:00  --------------------------------------------------------  IN: 0 mL / OUT: 400 mL / NET: -400 mL        PHYSICAL EXAM:  Constitutional: NAD, comfortable in bed.  HEENT: NC/AT, PERRLA, EOMI, no conjunctival pallor or scleral icterus, MMM  Neck: Supple, no JVD  Respiratory: Normal rate, rhythm, depth, effort. CTAB. No w/r/r.   Cardiovascular: RRR, normal S1 and S2, no m/r/g.   Gastrointestinal: +BS, soft NTND, no guarding or rebound tenderness, no palpable masses   Extremities: wwp; no cyanosis, clubbing or edema.   Vascular: Pulses equal and strong throughout.   Neurological: AAOx3, no CN deficits, strength and sensation intact throughout.   Skin: No gross skin abnormalities or rashes        LABS:                        9.4    8.74  )-----------( 230      ( 2019 08:03 )             30.2     07-13    142  |  104  |  11  ----------------------------<  214<H>  4.0   |  27  |  1.33<H>    Ca    8.8      2019 08:03  Phos  3.9     07-13  Mg     2.2     07-13    TPro  6.8  /  Alb  3.2<L>  /  TBili  0.5  /  DBili  x   /  AST  17  /  ALT  6<L>  /  AlkPhos  148<H>  07-13      Urinalysis Basic - ( 2019 12:09 )    Color: Yellow / Appearance: Clear / S.010 / pH: x  Gluc: x / Ketone: Trace mg/dL  / Bili: NEGATIVE / Urobili: 0.2 E.U./dL   Blood: x / Protein: 30 mg/dL / Nitrite: NEGATIVE   Leuk Esterase: NEGATIVE / RBC: x / WBC < 5 /HPF   Sq Epi: x / Non Sq Epi: 0-5 /HPF / Bacteria: x        RADIOLOGY & ADDITIONAL TESTS:    MEDICATIONS  (STANDING):  dextrose 5%. 1000 milliLiter(s) (50 mL/Hr) IV Continuous <Continuous>  dextrose 50% Injectable 12.5 Gram(s) IV Push once  heparin  Injectable 5000 Unit(s) SubCutaneous every 8 hours  insulin lispro (HumaLOG) corrective regimen sliding scale   SubCutaneous Before meals and at bedtime  levothyroxine 37.5 MICROGram(s) Oral daily  metoprolol tartrate 25 milliGRAM(s) Oral two times a day  piperacillin/tazobactam IVPB.. 3.375 Gram(s) IV Intermittent every 6 hours  simvastatin 20 milliGRAM(s) Oral at bedtime  sodium chloride 0.9%. 1000 milliLiter(s) (90 mL/Hr) IV Continuous <Continuous>  tamsulosin 0.4 milliGRAM(s) Oral at bedtime    MEDICATIONS  (PRN):  acetaminophen   Tablet .. 650 milliGRAM(s) Oral every 6 hours PRN Temp greater or equal to 38C (100.4F)  acetaminophen   Tablet .. 650 milliGRAM(s) Oral every 6 hours PRN Mild Pain (1 - 3)  dextrose 40% Gel 15 Gram(s) Oral once PRN Blood Glucose LESS THAN 70 milliGRAM(s)/deciliter  glucagon  Injectable 1 milliGRAM(s) IntraMuscular once PRN Glucose LESS THAN 70 milligrams/deciliter  oxyCODONE    5 mG/acetaminophen 325 mG 1 Tablet(s) Oral every 6 hours PRN Moderate Pain (4 - 6) OVERNIGHT EVENTS:    SUBJECTIVE:  feeling better  still w/ abd pain  no n/v/d  afebrile today      Vital Signs Last 12 Hrs  T(F): 97.6 (19 @ 08:40), Max: 99.8 (19 @ 02:34)  HR: 68 (19 @ 08:40) (68 - 110)  BP: 135/62 (19 @ 08:40) (122/63 - 149/63)  BP(mean): --  RR: 18 (19 @ 08:40) (18 - 19)  SpO2: 100% (19 @ 08:40) (95% - 100%)  I&O's Summary    2019 07:01  -  2019 07:00  --------------------------------------------------------  IN: 0 mL / OUT: 400 mL / NET: -400 mL        PHYSICAL EXAM:  Constitutional: NAD, comfortable in bed.  HEENT: left eye shut (chronic), NC/AT, PERRLA, EOMI, no conjunctival pallor or scleral icterus, MMM  Neck: Supple, no JVD  Respiratory: Normal rate, rhythm, depth, effort. CTAB. No w/r/r.   Cardiovascular: RRR, normal S1 and S2, no m/r/g.   Gastrointestinal: +BS, soft,  RLQ/LLQ tenderness,  no guarding or rebound tenderness, no palpable masses   Extremities: wwp; no cyanosis, clubbing or edema.   Vascular: Pulses equal and strong throughout.   Neurological: AAOx3, left eye blind, remainder of  CN ii-xii intact , 5/5 strength and sensation intact throughout.   Skin: No gross skin abnormalities or rashes        LABS:                        9.4    8.74  )-----------( 230      ( 2019 08:03 )             30.2         142  |  104  |  11  ----------------------------<  214<H>  4.0   |  27  |  1.33<H>    Ca    8.8      2019 08:03  Phos  3.9     07-  Mg     2.2     07-    TPro  6.8  /  Alb  3.2<L>  /  TBili  0.5  /  DBili  x   /  AST  17  /  ALT  6<L>  /  AlkPhos  148<H>  07-      Urinalysis Basic - ( 2019 12:09 )    Color: Yellow / Appearance: Clear / S.010 / pH: x  Gluc: x / Ketone: Trace mg/dL  / Bili: NEGATIVE / Urobili: 0.2 E.U./dL   Blood: x / Protein: 30 mg/dL / Nitrite: NEGATIVE   Leuk Esterase: NEGATIVE / RBC: x / WBC < 5 /HPF   Sq Epi: x / Non Sq Epi: 0-5 /HPF / Bacteria: x        RADIOLOGY & ADDITIONAL TESTS:    MEDICATIONS  (STANDING):  dextrose 5%. 1000 milliLiter(s) (50 mL/Hr) IV Continuous <Continuous>  dextrose 50% Injectable 12.5 Gram(s) IV Push once  heparin  Injectable 5000 Unit(s) SubCutaneous every 8 hours  insulin lispro (HumaLOG) corrective regimen sliding scale   SubCutaneous Before meals and at bedtime  levothyroxine 37.5 MICROGram(s) Oral daily  metoprolol tartrate 25 milliGRAM(s) Oral two times a day  piperacillin/tazobactam IVPB.. 3.375 Gram(s) IV Intermittent every 6 hours  simvastatin 20 milliGRAM(s) Oral at bedtime  sodium chloride 0.9%. 1000 milliLiter(s) (90 mL/Hr) IV Continuous <Continuous>  tamsulosin 0.4 milliGRAM(s) Oral at bedtime    MEDICATIONS  (PRN):  acetaminophen   Tablet .. 650 milliGRAM(s) Oral every 6 hours PRN Temp greater or equal to 38C (100.4F)  acetaminophen   Tablet .. 650 milliGRAM(s) Oral every 6 hours PRN Mild Pain (1 - 3)  dextrose 40% Gel 15 Gram(s) Oral once PRN Blood Glucose LESS THAN 70 milliGRAM(s)/deciliter  glucagon  Injectable 1 milliGRAM(s) IntraMuscular once PRN Glucose LESS THAN 70 milligrams/deciliter  oxyCODONE    5 mG/acetaminophen 325 mG 1 Tablet(s) Oral every 6 hours PRN Moderate Pain (4 - 6)

## 2019-07-13 NOTE — PROGRESS NOTE ADULT - PROBLEM SELECTOR PLAN 7
- On metformin 500mg at home, non insulin dependent  - Moderate insulin sliding scale, consistent carb diet  - A1c in AM

## 2019-07-13 NOTE — PROGRESS NOTE ADULT - PROBLEM SELECTOR PLAN 2
#Sepsis 2/2 lymph node infection  - Patient found to have fever and tachycardia on arrival w/ lower abdominal pain, CTAP revealing large necrotic lower abdominal lymph nodes with fat stranding, signs of infection/inflammation. Likely 2/2 metastatic CA vs radiation.   - S/p 1 dose CTX. Fluid resuscitated with 2200cc NS. Starting zosyn on arrival for intraabdominal infection, dosed based on CrCl of 46.   - F/u blood cultures    #Watery stool/constipation  - Patient with watery stool after taking milk of magnesia for constipation, constipation likely caused by lymph node inflammation, no stool burden seen on CT. Low suspicion for diarrheal infection, does not have c-diff risk factors, GI studies sent from [facility], will f/u.

## 2019-07-13 NOTE — PROGRESS NOTE ADULT - ATTENDING COMMENTS
Straight cane/needs device/independent fevers and abd pain improved  still unclear if truly an infectious process occuring, but necrotic LN seen in abdomen.   has defervesced on zosyn, will continue for now  f/u blood cxs  will need collateral from his oncologist (dr bass)

## 2019-07-13 NOTE — PATIENT PROFILE ADULT - NSPROEXTENSIONSOFSELF_GEN_A_NUR
I will SWITCH the dose or number of times a day I take the medications listed below when I get home from the hospital:  None none

## 2019-07-13 NOTE — H&P ADULT - PROBLEM SELECTOR PLAN 9
1.       PCP Contacted on Admission: (Y/N) --> Name & Phone #: No, Dr. Nina Blachman  2.       Date of Contact with PCP:  3.       PCP Contacted at Discharge: (Y/N)  4.       Summary of Handoff Given to PCP:  5.       Post-Discharge Appointment Date and Location: [facility]

## 2019-07-13 NOTE — H&P ADULT - PROBLEM SELECTOR PLAN 5
- On metformin 500mg at home, non insulin dependent  - Moderate insulin sliding scale, consistent carb diet  - A1c in AM - Continue simvastatin 20mg po qd

## 2019-07-13 NOTE — H&P ADULT - NSHPLABSRESULTS_GEN_ALL_CORE
LABS:                        11.0   9.8   )-----------( 237      ( 2019 10:03 )             32.9     07-12    139  |  99  |  13  ----------------------------<  255<H>  3.9   |  29  |  1.23    Ca    9.7      2019 10:03    TPro  7.5  /  Alb  2.9<L>  /  TBili  0.7  /  DBili  x   /  AST  26  /  ALT  9<L>  /  AlkPhos  197<H>        Urinalysis Basic - ( 2019 12:09 )    Color: Yellow / Appearance: Clear / S.010 / pH: x  Gluc: x / Ketone: Trace mg/dL  / Bili: NEGATIVE / Urobili: 0.2 E.U./dL   Blood: x / Protein: 30 mg/dL / Nitrite: NEGATIVE   Leuk Esterase: NEGATIVE / RBC: x / WBC < 5 /HPF   Sq Epi: x / Non Sq Epi: 0-5 /HPF / Bacteria: x      CAPILLARY BLOOD GLUCOSE          RADIOLOGY & ADDITIONAL TESTS: Reviewed.

## 2019-07-13 NOTE — H&P ADULT - PROBLEM SELECTOR PROBLEM 8
"ED Provider Note    Scribed for Martínez Hart M.D. by Richelle Albarado. 2/7/2017  10:30 PM    CHIEF COMPLAINT  Chief Complaint   Patient presents with   • Ear Pain     left.  began at 1943 this evening.        HPI  Jordan Chirinos is a 29 y.o. male who presents to the Emergency Room for evaluation of left ear pain that feels like his \"brain is burning,\" onset at approximately 7:30 tonight. He has taken tylenol with no relief. He has had fairly frequent prior ear infections as an adult. His last ear infection was approximately 3-4 months ago but not in the same ear. He denies any ear drainage or fever. He also denies putting anything in his ears such as Q-tips or earphones and denies any events of trauma.  The patient also has \" a lot of nasal issues\" that he takes nasal spray to alleviate. He has never been evaluated by an ENT.      REVIEW OF SYSTEMS  See HPI for further details.    PAST MEDICAL HISTORY  Past otitis media    SOCIAL HISTORY  Social History     Social History Main Topics   • Smoking status: Current Every Day Smoker   • Alcohol Use: Yes   • Drug Use: No       SURGICAL HISTORY  patient denies any surgical history    CURRENT MEDICATIONS  Tylenol and Nasal spray as needed.    ALLERGIES  No Known Allergies    PHYSICAL EXAM  VITAL SIGNS: /82 mmHg  Pulse 74  Temp(Src) 36.5 °C (97.7 °F)  Resp 16  Ht 1.803 m (5' 10.98\")  Wt 94.7 kg (208 lb 12.4 oz)  BMI 29.13 kg/m2  SpO2 94%  Pulse ox interpretation: I interpret this pulse ox as normal.  Constitutional: Alert in no apparent distress.  HENT: Mild erythema and opacity to the left tympanic membrane compared to normal right side. No evidence of ottitus externa, foreign body, or perforated ear drum. Normocephalic, Atraumatic, Bilateral external ears normal. Nose normal.   Eyes: Pupils are equal and reactive. Conjunctiva normal, non-icteric.   Skin: Warm, Dry, No erythema, No rash.   Neurologic: Alert, Grossly non-focal.   Psychiatric: Affect normal, " Judgment normal, Mood normal, Appears appropriate and not intoxicated.     COURSE & MEDICAL DECISION MAKING  The patient's VS, Nurses notes reviewed. (See chart for details)    10:30 PM Patient seen and examined at bedside. He has evidence of an early otitis media. I advised the patient to follow up with an ENT using our referral information, secondary to having multiple ear infections as an adult.  Patient will be treated with 1 tab Augmentin and 2 tab norco for his symptoms, and discharged with prescriptions for the same medications.    I have signed into and reviewed the patient's prescription monitoring program data prior to prescribing a scheduled drug.    The patient will return for new or worsening symptoms and is stable at the time of discharge.    The patient is referred to a primary physician for blood pressure management, diabetic screening, and for all other preventative health concerns.    DISPOSITION:  Patient will be discharged home in stable condition.    FOLLOW UP:  Demetrius Arthur M.D.  [address] [place] [id number]  [phone]    Schedule an appointment as soon as possible for a visit  for follow-up regarding your frequent ear infections      OUTPATIENT MEDICATIONS:  Discharge Medication List as of 2/7/2017 10:48 PM      START taking these medications    Details   hydrocodone-acetaminophen (NORCO) 5-325 MG Tab per tablet Take 1-2 Tabs by mouth every four hours as needed., Disp-12 Tab, R-0, Print Rx Paper               FINAL IMPRESSION  1. Right non-suppurative otitis media         Richelle DELGADO (Emi), am scribing for, and in the presence of, Martínez Hart M.D..    Electronically signed by: Richelle Conner), 2/7/2017    Martínez DELGADO M.D. personally performed the services described in this documentation, as scribed by Richelle Albarado in my presence, and it is both accurate and complete.    The note accurately reflects work and decisions made by me.  Martínez Hart  2/7/2017   11:11 PM           Nutrition, metabolism, and development symptoms

## 2019-07-13 NOTE — PROGRESS NOTE ADULT - PROBLEM SELECTOR PLAN 9
1.       PCP Contacted on Admission: (Y/N) --> Name & Phone #: No, Dr. Nina Blachman  2.       Date of Contact with PCP:  3.       PCP Contacted at Discharge: (Y/N)  4.       Summary of Handoff Given to PCP:  5.       Post-Discharge Appointment Date and Location: [facility]

## 2019-07-13 NOTE — H&P ADULT - HISTORY OF PRESENT ILLNESS
Patient is an 83 y/o male w hx of metastatic prostate cancer (on chemotherapy), HTN, HLD, DM who presented to [facility] with lower abdominal pain x 2-3 days. He states that he has been having a severe cramping lower abdominal pain which was associated with constipation and one episode of vomiting yesterday AM, however he had 2 episodes of watery diarrhea after taking milk of magnesia at home. He denied any chills but on arrival to [facility] he was found to be febrile to 102.6. Of note he also complains of R leg pain and pain on the top of his head for the last 3 days, which he has never had before. In [facility] he had a CTAP which showed extensive metastatic disease as well as large necrotic lymph nodes in the RLQ and lower abdomen. He received 1 dose of CTX, morphine 4mgx2, NS 2200cc's, and was transferred to [facility].

## 2019-07-13 NOTE — H&P ADULT - PROBLEM SELECTOR PLAN 2
Patient with known metastatic disease, follows with Dr. Grier who has prescribed chemotherapy. Patient is unsure what he takes, pharmacy is currently closed, will obtain collateral in AM.   - Will need d/w Dr. Grier to determine course of disease  - Patient indicates he would prefer not to be on life support, is DNR/DNI, however would like prognostication and discussion with family prior to any other change in treatment course. Recommend heme/onc consult.   - Palliative consult for cancer-related pain, responded to morphine in ED, however will require long-lasting regimen. Patient is also complaining of R leg pain and pain at the top of his skull. 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 #Sepsis 2/2 lymph node infection  - Patient found to have fever and tachycardia on arrival w/ lower abdominal pain, CTAP revealing large necrotic lower abdominal lymph nodes with fat stranding, signs of infection/inflammation. Likely 2/2 metastatic CA vs radiation.   - S/p 1 dose CTX. Fluid resuscitated with 2200cc NS. Starting zosyn on arrival for intraabdominal infection, dosed based on CrCl of 46.   - F/u blood cultures    #Watery stool/constipation  - Patient with watery stool after taking milk of magnesia for constipation, constipation likely caused by lymph node inflammation, no stool burden seen on CT. Low suspicion for diarrheal infection, does not have c-diff risk factors, GI studies sent from [facility], will f/u. see above

## 2019-07-13 NOTE — H&P ADULT - PROBLEM SELECTOR PLAN 4
- Continue home amlodipine 10mg po qd, metoprolol 25mg po qd Patient with known metastatic disease, follows with Dr. Grier who has prescribed chemotherapy. Patient is unsure what he takes, pharmacy is currently closed, will obtain collateral in AM.   - Will need d/w Dr. Grier to determine course of disease  - Patient indicates he would prefer not to be on life support, is DNR/DNI, however would like prognostication and discussion with family prior to any other change in treatment course. Recommend heme/onc consult.   - Palliative consult for cancer-related pain, responded to morphine in ED, however will require long-lasting regimen. Patient is also complaining of R leg pain and pain at the top of his skull. 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

## 2019-07-13 NOTE — H&P ADULT - NSICDXPASTMEDICALHX_GEN_ALL_CORE_FT
PAST MEDICAL HISTORY:  Essential hypertension     HLD (hyperlipidemia)     Prostate CA     T2DM (type 2 diabetes mellitus) PAST MEDICAL HISTORY:  Essential hypertension     History of eye prosthesis     HLD (hyperlipidemia)     Prostate CA     T2DM (type 2 diabetes mellitus)

## 2019-07-13 NOTE — H&P ADULT - PROBLEM SELECTOR PLAN 1
#Sepsis 2/2 lymph node infection  - Patient found to have fever and tachycardia on arrival w/ lower abdominal pain, CTAP revealing large necrotic lower abdominal lymph nodes with fat stranding, signs of infection/inflammation. Likely 2/2 metastatic CA vs radiation.   - S/p 1 dose CTX. Fluid resuscitated with 2200cc NS. Starting zosyn on arrival for intraabdominal infection, dosed based on CrCl of 46.   - F/u blood cultures    #Watery stool/constipation  - Patient with watery stool after taking milk of magnesia for constipation, constipation likely caused by lymph node inflammation, no stool burden seen on CT. Low suspicion for diarrheal infection, does not have c-diff risk factors, GI studies sent from [facility], will f/u. #Sepsis 2/2 lymph node infection  - Patient found to have fever and tachycardia on arrival w/ lower abdominal pain, CTAP revealing large necrotic lower abdominal lymph nodes with fat stranding, signs of infection/inflammation. Likely 2/2 metastatic CA vs radiation.   - S/p 1 dose CTX. Fluid resuscitated with 2200cc NS. Starting zosyn on arrival for intraabdominal infection, dosed based on CrCl of 46.   - F/u blood cultures

## 2019-07-13 NOTE — H&P ADULT - NSHPREVIEWOFSYSTEMS_GEN_ALL_CORE
REVIEW OF SYSTEMS:  CONSTITUTIONAL: No weakness, fevers or chills.   EYES/ENT: No visual changes;  No vertigo or throat pain   NECK: No pain or stiffness  RESPIRATORY: No cough, wheezing, hemoptysis; No shortness of breath  CARDIOVASCULAR: No chest pain or palpitations  GASTROINTESTINAL: Abdominal pain, nausea, diarrhea. No nausea, vomiting, or hematemesis; No diarrhea. No melena or hematochezia.  GENITOURINARY: No dysuria, frequency or hematuria  NEUROLOGICAL: No numbness or weakness  SKIN: No itching, burning, rashes, or lesions   All other review of systems is negative unless indicated above.

## 2019-07-14 DIAGNOSIS — R50.9 FEVER, UNSPECIFIED: ICD-10-CM

## 2019-07-14 DIAGNOSIS — R33.9 RETENTION OF URINE, UNSPECIFIED: ICD-10-CM

## 2019-07-14 DIAGNOSIS — I88.0 NONSPECIFIC MESENTERIC LYMPHADENITIS: ICD-10-CM

## 2019-07-14 LAB
ANION GAP SERPL CALC-SCNC: 15 MMOL/L — SIGNIFICANT CHANGE UP (ref 5–17)
BASOPHILS # BLD AUTO: 0.01 K/UL — SIGNIFICANT CHANGE UP (ref 0–0.2)
BASOPHILS NFR BLD AUTO: 0.1 % — SIGNIFICANT CHANGE UP (ref 0–2)
BUN SERPL-MCNC: 11 MG/DL — SIGNIFICANT CHANGE UP (ref 7–23)
CALCIUM SERPL-MCNC: 9 MG/DL — SIGNIFICANT CHANGE UP (ref 8.4–10.5)
CHLORIDE SERPL-SCNC: 103 MMOL/L — SIGNIFICANT CHANGE UP (ref 96–108)
CO2 SERPL-SCNC: 23 MMOL/L — SIGNIFICANT CHANGE UP (ref 22–31)
CREAT SERPL-MCNC: 1.12 MG/DL — SIGNIFICANT CHANGE UP (ref 0.5–1.3)
EOSINOPHIL # BLD AUTO: 0.08 K/UL — SIGNIFICANT CHANGE UP (ref 0–0.5)
EOSINOPHIL NFR BLD AUTO: 1 % — SIGNIFICANT CHANGE UP (ref 0–6)
GLUCOSE BLDC GLUCOMTR-MCNC: 126 MG/DL — HIGH (ref 70–99)
GLUCOSE BLDC GLUCOMTR-MCNC: 149 MG/DL — HIGH (ref 70–99)
GLUCOSE BLDC GLUCOMTR-MCNC: 151 MG/DL — HIGH (ref 70–99)
GLUCOSE BLDC GLUCOMTR-MCNC: 179 MG/DL — HIGH (ref 70–99)
GLUCOSE SERPL-MCNC: 183 MG/DL — HIGH (ref 70–99)
HCT VFR BLD CALC: 30.4 % — LOW (ref 39–50)
HGB BLD-MCNC: 9.5 G/DL — LOW (ref 13–17)
IMM GRANULOCYTES NFR BLD AUTO: 0.4 % — SIGNIFICANT CHANGE UP (ref 0–1.5)
LYMPHOCYTES # BLD AUTO: 0.63 K/UL — LOW (ref 1–3.3)
LYMPHOCYTES # BLD AUTO: 8.2 % — LOW (ref 13–44)
MAGNESIUM SERPL-MCNC: 2.3 MG/DL — SIGNIFICANT CHANGE UP (ref 1.6–2.6)
MCHC RBC-ENTMCNC: 29.3 PG — SIGNIFICANT CHANGE UP (ref 27–34)
MCHC RBC-ENTMCNC: 31.3 GM/DL — LOW (ref 32–36)
MCV RBC AUTO: 93.8 FL — SIGNIFICANT CHANGE UP (ref 80–100)
MONOCYTES # BLD AUTO: 0.59 K/UL — SIGNIFICANT CHANGE UP (ref 0–0.9)
MONOCYTES NFR BLD AUTO: 7.7 % — SIGNIFICANT CHANGE UP (ref 2–14)
NEUTROPHILS # BLD AUTO: 6.31 K/UL — SIGNIFICANT CHANGE UP (ref 1.8–7.4)
NEUTROPHILS NFR BLD AUTO: 82.6 % — HIGH (ref 43–77)
NRBC # BLD: 0 /100 WBCS — SIGNIFICANT CHANGE UP (ref 0–0)
PHOSPHATE SERPL-MCNC: 2.5 MG/DL — SIGNIFICANT CHANGE UP (ref 2.5–4.5)
PLATELET # BLD AUTO: 201 K/UL — SIGNIFICANT CHANGE UP (ref 150–400)
POTASSIUM SERPL-MCNC: 3.9 MMOL/L — SIGNIFICANT CHANGE UP (ref 3.5–5.3)
POTASSIUM SERPL-SCNC: 3.9 MMOL/L — SIGNIFICANT CHANGE UP (ref 3.5–5.3)
RBC # BLD: 3.24 M/UL — LOW (ref 4.2–5.8)
RBC # FLD: 13.3 % — SIGNIFICANT CHANGE UP (ref 10.3–14.5)
SODIUM SERPL-SCNC: 141 MMOL/L — SIGNIFICANT CHANGE UP (ref 135–145)
WBC # BLD: 7.65 K/UL — SIGNIFICANT CHANGE UP (ref 3.8–10.5)
WBC # FLD AUTO: 7.65 K/UL — SIGNIFICANT CHANGE UP (ref 3.8–10.5)

## 2019-07-14 PROCEDURE — 99233 SBSQ HOSP IP/OBS HIGH 50: CPT | Mod: GC

## 2019-07-14 RX ORDER — ENOXAPARIN SODIUM 100 MG/ML
40 INJECTION SUBCUTANEOUS EVERY 24 HOURS
Refills: 0 | Status: DISCONTINUED | OUTPATIENT
Start: 2019-07-14 | End: 2019-07-17

## 2019-07-14 RX ADMIN — AMLODIPINE BESYLATE 10 MILLIGRAM(S): 2.5 TABLET ORAL at 07:05

## 2019-07-14 RX ADMIN — TAMSULOSIN HYDROCHLORIDE 0.4 MILLIGRAM(S): 0.4 CAPSULE ORAL at 21:14

## 2019-07-14 RX ADMIN — PIPERACILLIN AND TAZOBACTAM 200 GRAM(S): 4; .5 INJECTION, POWDER, LYOPHILIZED, FOR SOLUTION INTRAVENOUS at 07:01

## 2019-07-14 RX ADMIN — PIPERACILLIN AND TAZOBACTAM 200 GRAM(S): 4; .5 INJECTION, POWDER, LYOPHILIZED, FOR SOLUTION INTRAVENOUS at 12:54

## 2019-07-14 RX ADMIN — OXYCODONE AND ACETAMINOPHEN 1 TABLET(S): 5; 325 TABLET ORAL at 01:17

## 2019-07-14 RX ADMIN — PIPERACILLIN AND TAZOBACTAM 200 GRAM(S): 4; .5 INJECTION, POWDER, LYOPHILIZED, FOR SOLUTION INTRAVENOUS at 01:18

## 2019-07-14 RX ADMIN — Medication 37.5 MICROGRAM(S): at 07:02

## 2019-07-14 RX ADMIN — OXYCODONE AND ACETAMINOPHEN 1 TABLET(S): 5; 325 TABLET ORAL at 16:24

## 2019-07-14 RX ADMIN — PIPERACILLIN AND TAZOBACTAM 200 GRAM(S): 4; .5 INJECTION, POWDER, LYOPHILIZED, FOR SOLUTION INTRAVENOUS at 18:13

## 2019-07-14 RX ADMIN — ENOXAPARIN SODIUM 40 MILLIGRAM(S): 100 INJECTION SUBCUTANEOUS at 14:50

## 2019-07-14 RX ADMIN — Medication 100 MILLIGRAM(S): at 11:30

## 2019-07-14 RX ADMIN — Medication 25 MILLIGRAM(S): at 18:13

## 2019-07-14 RX ADMIN — SIMVASTATIN 20 MILLIGRAM(S): 20 TABLET, FILM COATED ORAL at 21:14

## 2019-07-14 RX ADMIN — Medication 2: at 08:28

## 2019-07-14 RX ADMIN — SENNA PLUS 2 TABLET(S): 8.6 TABLET ORAL at 21:14

## 2019-07-14 RX ADMIN — OXYCODONE AND ACETAMINOPHEN 1 TABLET(S): 5; 325 TABLET ORAL at 09:50

## 2019-07-14 RX ADMIN — Medication 25 MILLIGRAM(S): at 07:01

## 2019-07-14 RX ADMIN — OXYCODONE AND ACETAMINOPHEN 1 TABLET(S): 5; 325 TABLET ORAL at 01:47

## 2019-07-14 RX ADMIN — OXYCODONE AND ACETAMINOPHEN 1 TABLET(S): 5; 325 TABLET ORAL at 10:20

## 2019-07-14 RX ADMIN — Medication 2: at 11:30

## 2019-07-14 RX ADMIN — HEPARIN SODIUM 5000 UNIT(S): 5000 INJECTION INTRAVENOUS; SUBCUTANEOUS at 07:01

## 2019-07-14 RX ADMIN — OXYCODONE AND ACETAMINOPHEN 1 TABLET(S): 5; 325 TABLET ORAL at 15:54

## 2019-07-14 NOTE — PROGRESS NOTE ADULT - ASSESSMENT
Patient is an 83 y/o male w hx of metastatic prostate cancer (on chemotherapy), HTN, HLD, DM who presented to [facility] with lower abdominal pain x 2-3 days, febrile in ED and found to have necrotic intraabdominal lymph nodes w fat stranding.

## 2019-07-14 NOTE — PROGRESS NOTE ADULT - SUBJECTIVE AND OBJECTIVE BOX
Patient is a 84y old  Male who presents with a chief complaint of Abdominal pain (13 Jul 2019 11:37)      INTERVAL HPI/OVERNIGHT EVENTS:    still c/o moderate abd pain (lower abdomen)  improved from this AM (on pain meds)  no n/v  no diarrhea  afebrile       ROS: 12 point review of systems otherwise negative              MEDICATIONS  (STANDING):  amLODIPine   Tablet 10 milliGRAM(s) Oral daily  dextrose 5%. 1000 milliLiter(s) (50 mL/Hr) IV Continuous <Continuous>  dextrose 50% Injectable 12.5 Gram(s) IV Push once  docusate sodium 100 milliGRAM(s) Oral daily  enoxaparin Injectable 40 milliGRAM(s) SubCutaneous every 24 hours  insulin lispro (HumaLOG) corrective regimen sliding scale   SubCutaneous Before meals and at bedtime  levothyroxine 37.5 MICROGram(s) Oral daily  metoprolol tartrate 25 milliGRAM(s) Oral two times a day  piperacillin/tazobactam IVPB.. 3.375 Gram(s) IV Intermittent every 6 hours  senna 2 Tablet(s) Oral at bedtime  simvastatin 20 milliGRAM(s) Oral at bedtime  tamsulosin 0.4 milliGRAM(s) Oral at bedtime    MEDICATIONS  (PRN):  acetaminophen   Tablet .. 650 milliGRAM(s) Oral every 6 hours PRN Temp greater or equal to 38C (100.4F)  acetaminophen   Tablet .. 650 milliGRAM(s) Oral every 6 hours PRN Mild Pain (1 - 3)  dextrose 40% Gel 15 Gram(s) Oral once PRN Blood Glucose LESS THAN 70 milliGRAM(s)/deciliter  glucagon  Injectable 1 milliGRAM(s) IntraMuscular once PRN Glucose LESS THAN 70 milligrams/deciliter  oxyCODONE    5 mG/acetaminophen 325 mG 1 Tablet(s) Oral every 6 hours PRN Moderate Pain (4 - 6)      Allergies    No Known Allergies    Intolerances          Vital Signs Last 24 Hrs  T(C): 36.9 (14 Jul 2019 15:36), Max: 37.2 (13 Jul 2019 21:54)  T(F): 98.5 (14 Jul 2019 15:36), Max: 99 (13 Jul 2019 21:54)  HR: 76 (14 Jul 2019 15:36) (76 - 90)  BP: 166/66 (14 Jul 2019 15:36) (146/62 - 166/66)  BP(mean): --  RR: 18 (14 Jul 2019 15:36) (18 - 20)  SpO2: 94% (14 Jul 2019 15:36) (94% - 97%)  CAPILLARY BLOOD GLUCOSE      POCT Blood Glucose.: 126 mg/dL (14 Jul 2019 17:26)  POCT Blood Glucose.: 151 mg/dL (14 Jul 2019 11:29)  POCT Blood Glucose.: 179 mg/dL (14 Jul 2019 08:21)  POCT Blood Glucose.: 133 mg/dL (13 Jul 2019 22:33)      07-14 @ 07:01  -  07-14 @ 18:08  --------------------------------------------------------  IN: 0 mL / OUT: 250 mL / NET: -250 mL        Physical Exam:    Daily     Daily   General:  Well appearing   HEENT:  Nonicteric, absence of left eye, oral pharynx w/o erythema/exudate,  neck supple  CV:  [id number] , RRR,  no JVD  Lungs:  CTA B/L, no wheezes, rales, rhonchi  Abdomen:  positive BS, Soft, RLQ/SP/LLQ tender, no deborah/guarding , mildly distended, no hepatosplenomegaly  Extremities:  2+ DP/radial pulses b/l, no cyanosis, no edema  Back: no cva or midline tenderness  Skin:  Warm and dry   Neuro:  AAOx3,  CN II-XII grossly intact, 5/5 str all 4 ext, sensation intact,    No Restraints    LABS:                        9.5    7.65  )-----------( 201      ( 14 Jul 2019 08:22 )             30.4     07-14    141  |  103  |  11  ----------------------------<  183<H>  3.9   |  23  |  1.12    Ca    9.0      14 Jul 2019 08:22  Phos  2.5     07-14  Mg     2.3     07-14    TPro  6.8  /  Alb  3.2<L>  /  TBili  0.5  /  DBili  x   /  AST  17  /  ALT  6<L>  /  AlkPhos  148<H>  07-13            RADIOLOGY & ADDITIONAL TESTS:

## 2019-07-14 NOTE — PROGRESS NOTE ADULT - PROBLEM SELECTOR PLAN 6
- On metformin 500mg at home, non insulin dependent  - Moderate insulin sliding scale, consistent carb diet

## 2019-07-15 DIAGNOSIS — K59.00 CONSTIPATION, UNSPECIFIED: ICD-10-CM

## 2019-07-15 DIAGNOSIS — A41.9 SEPSIS, UNSPECIFIED ORGANISM: ICD-10-CM

## 2019-07-15 DIAGNOSIS — Z51.5 ENCOUNTER FOR PALLIATIVE CARE: ICD-10-CM

## 2019-07-15 DIAGNOSIS — R52 PAIN, UNSPECIFIED: ICD-10-CM

## 2019-07-15 DIAGNOSIS — Z71.89 OTHER SPECIFIED COUNSELING: ICD-10-CM

## 2019-07-15 LAB
CULTURE RESULTS: SIGNIFICANT CHANGE UP
GLUCOSE BLDC GLUCOMTR-MCNC: 209 MG/DL — HIGH (ref 70–99)
SPECIMEN SOURCE: SIGNIFICANT CHANGE UP

## 2019-07-15 PROCEDURE — 99223 1ST HOSP IP/OBS HIGH 75: CPT

## 2019-07-15 PROCEDURE — 74022 RADEX COMPL AQT ABD SERIES: CPT | Mod: 26

## 2019-07-15 PROCEDURE — 99233 SBSQ HOSP IP/OBS HIGH 50: CPT | Mod: GC

## 2019-07-15 RX ORDER — OXYCODONE HYDROCHLORIDE 5 MG/1
5 TABLET ORAL EVERY 4 HOURS
Refills: 0 | Status: DISCONTINUED | OUTPATIENT
Start: 2019-07-15 | End: 2019-07-17

## 2019-07-15 RX ORDER — LISINOPRIL 2.5 MG/1
5 TABLET ORAL DAILY
Refills: 0 | Status: DISCONTINUED | OUTPATIENT
Start: 2019-07-15 | End: 2019-07-17

## 2019-07-15 RX ORDER — POTASSIUM PHOSPHATE, MONOBASIC POTASSIUM PHOSPHATE, DIBASIC 236; 224 MG/ML; MG/ML
30 INJECTION, SOLUTION INTRAVENOUS ONCE
Refills: 0 | Status: COMPLETED | OUTPATIENT
Start: 2019-07-15 | End: 2019-07-15

## 2019-07-15 RX ORDER — TAMSULOSIN HYDROCHLORIDE 0.4 MG/1
0.8 CAPSULE ORAL AT BEDTIME
Refills: 0 | Status: DISCONTINUED | OUTPATIENT
Start: 2019-07-15 | End: 2019-07-17

## 2019-07-15 RX ORDER — POTASSIUM CHLORIDE 20 MEQ
40 PACKET (EA) ORAL ONCE
Refills: 0 | Status: COMPLETED | OUTPATIENT
Start: 2019-07-15 | End: 2019-07-15

## 2019-07-15 RX ADMIN — OXYCODONE AND ACETAMINOPHEN 1 TABLET(S): 5; 325 TABLET ORAL at 12:06

## 2019-07-15 RX ADMIN — OXYCODONE HYDROCHLORIDE 5 MILLIGRAM(S): 5 TABLET ORAL at 16:35

## 2019-07-15 RX ADMIN — POTASSIUM PHOSPHATE, MONOBASIC POTASSIUM PHOSPHATE, DIBASIC 83.33 MILLIMOLE(S): 236; 224 INJECTION, SOLUTION INTRAVENOUS at 15:24

## 2019-07-15 RX ADMIN — Medication 25 MILLIGRAM(S): at 19:21

## 2019-07-15 RX ADMIN — OXYCODONE HYDROCHLORIDE 5 MILLIGRAM(S): 5 TABLET ORAL at 20:49

## 2019-07-15 RX ADMIN — Medication 4: at 08:53

## 2019-07-15 RX ADMIN — OXYCODONE AND ACETAMINOPHEN 1 TABLET(S): 5; 325 TABLET ORAL at 13:05

## 2019-07-15 RX ADMIN — PIPERACILLIN AND TAZOBACTAM 200 GRAM(S): 4; .5 INJECTION, POWDER, LYOPHILIZED, FOR SOLUTION INTRAVENOUS at 08:53

## 2019-07-15 RX ADMIN — OXYCODONE HYDROCHLORIDE 5 MILLIGRAM(S): 5 TABLET ORAL at 17:30

## 2019-07-15 RX ADMIN — Medication 100 MILLIGRAM(S): at 12:06

## 2019-07-15 RX ADMIN — OXYCODONE HYDROCHLORIDE 5 MILLIGRAM(S): 5 TABLET ORAL at 21:30

## 2019-07-15 RX ADMIN — OXYCODONE AND ACETAMINOPHEN 1 TABLET(S): 5; 325 TABLET ORAL at 05:26

## 2019-07-15 RX ADMIN — OXYCODONE AND ACETAMINOPHEN 1 TABLET(S): 5; 325 TABLET ORAL at 06:00

## 2019-07-15 RX ADMIN — AMLODIPINE BESYLATE 10 MILLIGRAM(S): 2.5 TABLET ORAL at 05:15

## 2019-07-15 RX ADMIN — LISINOPRIL 5 MILLIGRAM(S): 2.5 TABLET ORAL at 09:44

## 2019-07-15 RX ADMIN — PIPERACILLIN AND TAZOBACTAM 200 GRAM(S): 4; .5 INJECTION, POWDER, LYOPHILIZED, FOR SOLUTION INTRAVENOUS at 02:06

## 2019-07-15 RX ADMIN — ENOXAPARIN SODIUM 40 MILLIGRAM(S): 100 INJECTION SUBCUTANEOUS at 14:19

## 2019-07-15 RX ADMIN — Medication 40 MILLIEQUIVALENT(S): at 14:19

## 2019-07-15 RX ADMIN — SENNA PLUS 2 TABLET(S): 8.6 TABLET ORAL at 21:03

## 2019-07-15 RX ADMIN — Medication 25 MILLIGRAM(S): at 05:16

## 2019-07-15 RX ADMIN — TAMSULOSIN HYDROCHLORIDE 0.8 MILLIGRAM(S): 0.4 CAPSULE ORAL at 21:03

## 2019-07-15 RX ADMIN — SIMVASTATIN 20 MILLIGRAM(S): 20 TABLET, FILM COATED ORAL at 21:03

## 2019-07-15 RX ADMIN — Medication 37.5 MICROGRAM(S): at 05:16

## 2019-07-15 NOTE — CONSULT NOTE ADULT - PROBLEM SELECTOR RECOMMENDATION 9
see pain assessment above  presently taking Percocet used 3-4 tabs daily over past 2 days   d/c Percocet   consider Oxycontin 10 mg po q 12 hr with Oxycodone 5 mg po q 4 hr prn

## 2019-07-15 NOTE — CONSULT NOTE ADULT - PROBLEM SELECTOR RECOMMENDATION 8
reviewed code status, confirmed DNR/DNI staus with MOLST completed (ACP time 2:20P-2:40P 20 min)   As his oncology hx is vague and plans for future tx unknownIt seems reasonable for pt to have GOC discussion with his oncologist with whom he is scheduled to see later this week.

## 2019-07-15 NOTE — CONSULT NOTE ADULT - ASSESSMENT
85 y/o male w hx of metastatic prostate cancer to (on chemotherapy), HTN, HLD, DM who presented to [facility] with lower abdominal pain x 2-3 days. He states that he has been having a severe cramping lower abdominal pain which was associated with constipation and one episode of vomiting 83 y/o male w hx of metastatic prostate cancer to (on chemotherapy), HTN, HLD, DM who presented to [facility] with lower abdominal pain x 2-3 days. He states that he has been having a severe cramping lower abdominal pain which was associated with constipation and one episode of vomiting

## 2019-07-15 NOTE — PROGRESS NOTE ADULT - ASSESSMENT
Patient is an 85 y/o male w hx of metastatic prostate cancer (on chemotherapy), HTN, HLD, DM who presented to [facility] with lower abdominal pain x 2-3 days, febrile in ED and found to have necrotic intraabdominal lymph nodes w fat stranding. Patient is an 85 y/o male w hx of metastatic prostate cancer (on chemotherapy), HTN, HLD, DM who presented to [facility] with lower abdominal pain x 2-3 days, febrile in ED and found to have necrotic intraabdominal lymph nodes w fat stranding. Patient is an 85 y/o male w hx of metastatic prostate cancer (on chemotherapy), HTN, HLD, DM who presented to [facility] with lower abdominal pain x 2-3 days, febrile in ED and found to have necrotic intraabdominal lymph nodes w fat stranding. Pain responding to medical managment and after BMs, on zosyn currently, has been afebrile since admission.

## 2019-07-15 NOTE — CONSULT NOTE ADULT - PROBLEM SELECTOR RECOMMENDATION 5
treated by Dr Jacome at [facility].  Pt with poor knowledge of his previous tx. Med team to reach out to obtain collateral

## 2019-07-15 NOTE — CONSULT NOTE ADULT - PROBLEM SELECTOR RECOMMENDATION 4
possible mesenteric lymphadenitis. UA negative (7/12); blood cultx ngtd (7/12), GI pcr negative (7/13), cxr negative. Afebrile since admission  - transition to oral today, levaquin 750mg PO qd.

## 2019-07-15 NOTE — CONSULT NOTE ADULT - PROBLEM SELECTOR RECOMMENDATION 2
pt states increasing constipation over past few months.   states he had 2 episodes of liquid stool over weekend. likely oozing around stool  reports recent straining with defecation  received Dulcolax suppository last night with small formed stool today   continue Senna/Colace, dulcolax supp prn  must be diligent to assess for s/s OIC

## 2019-07-15 NOTE — PROGRESS NOTE ADULT - PROBLEM SELECTOR PLAN 5
amlodipine 10mg po qd, metoprolol 25mg po qd amlodipine 10mg po qd, metoprolol 25mg po qd, lisinopril 5mg qd

## 2019-07-15 NOTE — PROGRESS NOTE ADULT - PROBLEM SELECTOR PLAN 1
possible mesenteric lymphadenitis  will cont zosyn for now  blood cxs neg  cxr (-) infiltrate possible mesenteric lymphadenitis. UA negative (7/12); blood cultx ngtd (7/12), GI pcr negative (7/13)  will cont zosyn for now  blood cxs neg  cxr (-) infiltrate possible mesenteric lymphadenitis. UA negative (7/12); blood cultx ngtd (7/12), GI pcr negative (7/13). Afebrile since admission  will cont zosyn for now  blood cxs neg  cxr (-) infiltrate possible mesenteric lymphadenitis. UA negative (7/12); blood cultx ngtd (7/12), GI pcr negative (7/13). Afebrile since admission  - transition to oral today  blood cxs neg  cxr (-) infiltrate possible mesenteric lymphadenitis. UA negative (7/12); blood cultx ngtd (7/12), GI pcr negative (7/13), cxr negative. Afebrile since admission  - transition to oral today, levaquin 750mg PO qd

## 2019-07-15 NOTE — CONSULT NOTE ADULT - SUBJECTIVE AND OBJECTIVE BOX
AMALIA CHAPMAN   MRN-[id number]    : 1935    HPI:  Patient is an 85 y/o male w hx of metastatic prostate cancer with (on chemotherapy), HTN, HLD, DM who presented to [facility] with lower abdominal pain x 2-3 days. He states that he has been having a severe cramping lower abdominal pain which was associated with constipation and one episode of vomiting yesterday AM, however he had 2 episodes of watery diarrhea after taking milk of magnesia at home. He denied any chills but on arrival to [facility] he was found to be febrile to 102.6. Of note he also complains of R leg pain and pain on the top of his head for the last 3 days, which he has never had before. In [facility] he had a CTAP which showed extensive metastatic disease as well as large necrotic lymph nodes in the RLQ and lower abdomen. He received 1 dose of CTX, morphine 4mgx2, NS 2200cc's, and was transferred to [facility]. (2019 00:24)    CTAP: reveals Extensive solid and necrotic lymphadenopathy throughout the abdomen and right pelvic sidewall       PAST MEDICAL & SURGICAL HISTORY:  History of eye prosthesis  T2DM (type 2 diabetes mellitus)  HLD (hyperlipidemia)  Essential hypertension  Prostate CA  S/P hernia repair    FAMILY HISTORY:  FH: ovarian cancer: mother; medical history of father is unknown    ROS:                     Dyspnea (Elizabeth 0-10):                        N/V (Y/N):                              Secretions (Y/N) :                Agitation(Y/N):   Pain (Y/N):       -Provocation/Palliation:  -Quality/Quantity:  -Radiating:  -Severity:  -Timing/Frequency:  -Impact on ADLs:    General:  Denied  HEENT:    Denied  Neck:  Denied  CVS:  Denied  Resp:  Denied  GI:  Denied  :  Denied  Musc:  Denied  Neuro:  Denied  Psych:  Denied  Skin:  Denied  Lymph:  Denied    Allergies    No Known Allergies    Intolerances    Opiate Naive (Y/N): N received Morphine in [facility], now on Percocet in hospital   -iStop reviewed (Y/N): Yes I Ref: [id number]	    Medications:      MEDICATIONS  (STANDING):  amLODIPine   Tablet 10 milliGRAM(s) Oral daily  dextrose 5%. 1000 milliLiter(s) (50 mL/Hr) IV Continuous <Continuous>  dextrose 50% Injectable 12.5 Gram(s) IV Push once  docusate sodium 100 milliGRAM(s) Oral daily  enoxaparin Injectable 40 milliGRAM(s) SubCutaneous every 24 hours  insulin lispro (HumaLOG) corrective regimen sliding scale   SubCutaneous Before meals and at bedtime  levoFLOXacin  Tablet 750 milliGRAM(s) Oral every 24 hours  levothyroxine 37.5 MICROGram(s) Oral daily  lisinopril 5 milliGRAM(s) Oral daily  metoprolol tartrate 25 milliGRAM(s) Oral two times a day  senna 2 Tablet(s) Oral at bedtime  simvastatin 20 milliGRAM(s) Oral at bedtime  tamsulosin 0.8 milliGRAM(s) Oral at bedtime    MEDICATIONS  (PRN):  acetaminophen   Tablet .. 650 milliGRAM(s) Oral every 6 hours PRN Temp greater or equal to 38C (100.4F)  acetaminophen   Tablet .. 650 milliGRAM(s) Oral every 6 hours PRN Mild Pain (1 - 3)  bisacodyl Suppository 10 milliGRAM(s) Rectal once PRN Constipation  dextrose 40% Gel 15 Gram(s) Oral once PRN Blood Glucose LESS THAN 70 milliGRAM(s)/deciliter  glucagon  Injectable 1 milliGRAM(s) IntraMuscular once PRN Glucose LESS THAN 70 milligrams/deciliter  oxyCODONE    5 mG/acetaminophen 325 mG 1 Tablet(s) Oral every 6 hours PRN Moderate Pain (4 - 6)      Labs:    CBC:                        9.5    5.64  )-----------( 240      ( 15 Jul 2019 12:51 )             29.8     CMP:    -15    138  |  101  |  7   ----------------------------<  115<H>  2.9<LL>   |  22  |  0.87    Ca    9.1      15 Jul 2019 12:51  Phos  1.8     -15  Mg     2.0     -15    Culture - Blood (19 @ 16:59)    Specimen Source: .Blood Blood-Peripheral    Culture Results:   No growth to date.    Urinalysis (19 @ 12:09)    Glucose Qualitative, Urine: 500    Blood, Urine: Trace    pH Urine: 7.0    Color: Yellow    Urine Appearance: Clear    Bilirubin: NEGATIVE    Ketone - Urine: Trace mg/dL    Specific Gravity: 1.010    Protein, Urine: 30 mg/dL    Urobilinogen: 0.2 E.U./dL    Nitrite: NEGATIVE    Leukocyte Esterase Concentration: NEGATIVE    Clostridium difficile Toxin by PCR (19 @ 03:02)    C Diff by PCR Result: NotDetec    Imaging:    EXAM:  CT ABDOMEN AND PELVIS IC                           PROCEDURE DATE:  2019        INTERPRETATION:  CLINICAL INFORMATION: Fever and diffuse lower abdominal   pain. 84 male pt, hx of HTN, HLD, DM, prostate CA, presents w worsening   lower abd pain since yesterday. Nausea, vomiting as well. on arrival is   having diarrhea episodes. watery brown. low grade temp oral. rectal to be   done now. States pain started last night and has gradually gotten worse.    COMPARISON: None.    PIMPRESSION:   1.  Extensive solid and necrotic lymphadenopathy throughout the abdomen   and right pelvic sidewall with a prominent right lower quadrant partially   necrotic lymph node with extensive surrounding fat stranding, which is   likely the etiology of patient's right-sided mild hydroureteronephrosis.   Correlate for UTI.   2.  There are extensive osseous metastases. These findings most likely   represent recurrence of patient's prostate cancer.  3.   Extensive atheromatous disease of the aortoiliac system and   branches. 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    PEx:  T(C): 37.3 (07-15-19 @ 09:10), Max: 37.3 (07-15-19 @ 09:10)  HR: 83 (07-15-19 @ 09:10) (75 - 88)  BP: 171/63 (07-15-19 @ 09:10) (159/62 - 186/60)  RR: 18 (07-15-19 @ 09:10) (17 - 18)  SpO2: 96% (07-15-19 @ 09:10) (94% - 97%)  Wt(kg): --75.7  CAPILLARY BLOOD GLUCOSE      POCT Blood Glucose.: 113 mg/dL (15 Jul 2019 12:34)    I&O's Summary    2019 07:01  -  15 Jul 2019 07:00  --------------------------------------------------------  IN: 0 mL / OUT: 250 mL / NET: -250 mL    General:   HEENT:    Neck:   CVS:   Resp:   GI:    :    Musc:     Neuro:   Psych:     Skin:  Lymph:  Preadmit Karnofsky:  %           Current Karnofsky:     %  Cachexia (Y/N):   BMI: 21.4    Advanced Directives   DNR/DNI   MOLST       Decision maker:  Legal surrogate: Vipin Mullen (brother): [phone]    Social History:     GOALS OF CARE DISCUSSION       Palliative care info/counseling provided	           Family meeting       Advanced Directives addressed please see Advance Care Planning Note	           See previous Palliative Medicine Note       Documentation of GOC: 	          REFERRALS	        Palliative Med        Unit SW/Case Mgmt              Speech/Swallow       Patient/Family Support       Massage Therapy       Music Therapy       Hospice       Nutrition       Ethics       PT/OT AMALIA CHAPMAN   MRN-[id number]    : 1935    HPI:  Patient is an 85 y/o male w hx of metastatic prostate cancer with (on chemotherapy), HTN, HLD, DM who presented to [facility] with lower abdominal pain x 2-3 days. He states that he has been having a severe cramping lower abdominal pain which was associated with constipation and one episode of vomiting yesterday AM, however he had 2 episodes of watery diarrhea after taking milk of magnesia at home. He denied any chills but on arrival to [facility] he was found to be febrile to 102.6. Of note he also complains of R leg pain and pain on the top of his head for the last 3 days, which he has never had before. In [facility] he had a CTAP which showed extensive metastatic disease as well as large necrotic lymph nodes in the RLQ and lower abdomen. He received 1 dose of CTX, morphine 4mgx2, NS 2200cc's, and was transferred to [facility]. (2019 00:24)    CTAP: reveals Extensive solid and necrotic lymphadenopathy throughout the abdomen and right pelvic sidewall Palliative Medicine consulted to assist with symptom management and GOC discussion       PAST MEDICAL & SURGICAL HISTORY:  History of eye prosthesis  T2DM (type 2 diabetes mellitus)  HLD (hyperlipidemia)  Essential hypertension  Prostate CA  S/P hernia repair    FAMILY HISTORY:  FH: ovarian cancer: mother; medical history of father is unknown    ROS:                     Dyspnea (Elizabeth 0-10): 0                       N/V (Y/N): N                              Secretions (Y/N) :  N              Agitation(Y/N): N  Pain (Y/N):  pt with c/o intermittent spontaneous sharp stabbing pain to RUQ and along pubis region. Unable to accurately utilize 0-10 pain scale originally stating pain at 10, but on further assessment he reports having medium pain. States pain exacerbates when he has the urge to urinate  and is much  relieved following urination and use of  with opioids.     General:  Denied  HEENT:    Denied  Neck:  Denied  CVS:  Denied  Resp:  Denied  GI:  intermittent constipation, abdominal pain    :  Denied  Musc:  Denied  Neuro:  Denied  Psych:  Denied  Skin:  Denied  Lymph:  Denied    Allergies    No Known Allergies    Intolerances    Opiate Naive (Y/N): N received Morphine in [facility], now on Percocet in hospital   -iStop reviewed (Y/N): Yes I Ref: [id number]	    Medications:      MEDICATIONS  (STANDING):  amLODIPine   Tablet 10 milliGRAM(s) Oral daily  dextrose 5%. 1000 milliLiter(s) (50 mL/Hr) IV Continuous <Continuous>  dextrose 50% Injectable 12.5 Gram(s) IV Push once  docusate sodium 100 milliGRAM(s) Oral daily  enoxaparin Injectable 40 milliGRAM(s) SubCutaneous every 24 hours  insulin lispro (HumaLOG) corrective regimen sliding scale   SubCutaneous Before meals and at bedtime  levoFLOXacin  Tablet 750 milliGRAM(s) Oral every 24 hours  levothyroxine 37.5 MICROGram(s) Oral daily  lisinopril 5 milliGRAM(s) Oral daily  metoprolol tartrate 25 milliGRAM(s) Oral two times a day  senna 2 Tablet(s) Oral at bedtime  simvastatin 20 milliGRAM(s) Oral at bedtime  tamsulosin 0.8 milliGRAM(s) Oral at bedtime    MEDICATIONS  (PRN):  acetaminophen   Tablet .. 650 milliGRAM(s) Oral every 6 hours PRN Temp greater or equal to 38C (100.4F)  acetaminophen   Tablet .. 650 milliGRAM(s) Oral every 6 hours PRN Mild Pain (1 - 3)  bisacodyl Suppository 10 milliGRAM(s) Rectal once PRN Constipation  dextrose 40% Gel 15 Gram(s) Oral once PRN Blood Glucose LESS THAN 70 milliGRAM(s)/deciliter  glucagon  Injectable 1 milliGRAM(s) IntraMuscular once PRN Glucose LESS THAN 70 milligrams/deciliter  oxyCODONE    5 mG/acetaminophen 325 mG 1 Tablet(s) Oral every 6 hours PRN Moderate Pain (4 - 6)      Labs:    CBC:                        9.5    5.64  )-----------( 240      ( 15 Jul 2019 12:51 )             29.8     CMP:    -15    138  |  101  |  7   ----------------------------<  115<H>  2.9<LL>   |  22  |  0.87    Ca    9.1      15 Jul 2019 12:51  Phos  1.8     07-15  Mg     2.0     07-15    Culture - Blood (19 @ 16:59)    Specimen Source: .Blood Blood-Peripheral    Culture Results:   No growth to date.    Urinalysis (19 @ 12:09)    Glucose Qualitative, Urine: 500    Blood, Urine: Trace    pH Urine: 7.0    Color: Yellow    Urine Appearance: Clear    Bilirubin: NEGATIVE    Ketone - Urine: Trace mg/dL    Specific Gravity: 1.010    Protein, Urine: 30 mg/dL    Urobilinogen: 0.2 E.U./dL    Nitrite: NEGATIVE    Leukocyte Esterase Concentration: NEGATIVE    Clostridium difficile Toxin by PCR (19 @ 03:02)    C Diff by PCR Result: NotDetec    Imaging:    EXAM:  CT ABDOMEN AND PELVIS IC                           PROCEDURE DATE:  2019        INTERPRETATION:  CLINICAL INFORMATION: Fever and diffuse lower abdominal   pain. 84 male pt, hx of HTN, HLD, DM, prostate CA, presents w worsening   lower abd pain since yesterday. Nausea, vomiting as well. on arrival is   having diarrhea episodes. watery brown. low grade temp oral. rectal to be   done now. States pain started last night and has gradually gotten worse.    COMPARISON: None.    PIMPRESSION:   1.  Extensive solid and necrotic lymphadenopathy throughout the abdomen   and right pelvic sidewall with a prominent right lower quadrant partially   necrotic lymph node with extensive surrounding fat stranding, which is   likely the etiology of patient's right-sided mild hydroureteronephrosis.   Correlate for UTI.   2.  There are extensive osseous metastases. These findings most likely   represent recurrence of patient's prostate cancer.  3.   Extensive atheromatous disease of the aortoiliac system and   branches. 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    PEx:  T(C): 37.3 (07-15-19 @ 09:10), Max: 37.3 (07-15-19 @ 09:10)  HR: 83 (07-15-19 @ 09:10) (75 - 88)  BP: 171/63 (07-15-19 @ 09:10) (159/62 - 186/60)  RR: 18 (07-15-19 @ 09:10) (17 - 18)  SpO2: 96% (07-15-19 @ 09:10) (94% - 97%)  Wt(kg): --75.7  CAPILLARY BLOOD GLUCOSE      POCT Blood Glucose.: 113 mg/dL (15 Jul 2019 12:34)    I&O's Summary    2019 07:01  -  15 Jul 2019 07:00  --------------------------------------------------------  IN: 0 mL / OUT: 250 mL / NET: -250 mL    General: elderly AA male in bed c/o abdominal pain   HEENT: NC/AT,  enucleated Left eye, poor dentition, MMM  Neck: supple, no JVD   CVS: S1S2 RRR no MRG  Resp: CTA B/L no RRW  GI: + ascites, hypoacttive BS x 4, pt states formed BM this AM    :  voiding freely  Musc:  MARCUM 5/ strenth    Neuro: AxOx3, no focal deficita  Psych:  calm and cooperative   Skin: no rashes  Lymph: No LAD  Preadmit Karnofsky: 40 %           Current Karnofsky:  30-40 %  Cachexia (Y/N): N  BMI: 21.4    Advanced Directives   DNR/DNI   MOLST       Decision maker: pt has capacity to make complex medical decisions, needs more simplistic explanations   Legal surrogate: Vipin Mullen (brother): [phone]    Social History: lives with adult son, has another son with whom he is estranged [employer]    GOALS OF CARE DISCUSSION       Palliative care info/counseling provided	           Family meeting       Advanced Directives addressed please see Advance Care Planning Note	           Documentation of GOC: pending primary team gaining collateral from oncology, pt states he had follow up with Dr Jacome from [facility] in AM  	          REFERRALS	        Palliative Med        Unit SW/Case Mgmt              Music Therapy      Nutrition      PT/OT

## 2019-07-15 NOTE — PROGRESS NOTE ADULT - PROBLEM SELECTOR PLAN 7
pvr ~300ml on bladder scan  straight cath for more accurate measurement - pvr ~125ml yesterday  - f/u bladder scan today, if increased (>300) consider straight cath for more accurate measurement - pvr ~125ml yesterday  - f/u bladder scan today, if increased (>300) consider straight cath for more accurate measurement  - flomax 0.8mg at bedtime (up from .4mg)

## 2019-07-15 NOTE — CONSULT NOTE ADULT - PROBLEM SELECTOR RECOMMENDATION 7
Support provided to patient and family. Patient to have access to supportive services during rest of hospital stay as the patient/family deemed necessary ie. Chaplaincy, Massage therapy, Music therapy, Patient and family supportive services, Palliative SW, etc.    Once PSSA completed recs to follow

## 2019-07-15 NOTE — PROGRESS NOTE ADULT - PROBLEM SELECTOR PLAN 8
1) PCP Contacted on Admission: (Y/N) --> Name & Phone #:  2) Date of Contact with PCP:  3) PCP Contacted at Discharge: (Y/N, N/A)  4) Summary of Handoff Given to PCP:   5) Post-Discharge Appointment Date and Location: Dr. Grier ([phone], [facility])

## 2019-07-15 NOTE — PROGRESS NOTE ADULT - PROBLEM SELECTOR PLAN 2
cont zosyn  pain control cont zosyn  #pain control  - pain well controlled with percocet - treat as above  #pain control  - pain well controlled with percocet - treat as above  #pain control  - pain well controlled, switched to 5mg oxy q4h

## 2019-07-15 NOTE — PROGRESS NOTE ADULT - ATTENDING COMMENTS
Patient was seen and examined with the resident team today.  I agree with Dr. Cerrato' assessment and plan with the following exceptions/additions:     Briefly, this is a 85yo AA gentleman, former smoker, with a PMH of metastatic prostate cancer (on chemo, Dr. Jacome), enucleated eye s/p childhood trauma, HTN, HLD and poorly controlled NIDDM2 (A1c 9.5%) who p/w acute onset lower abdominal pain x 2-3 days associated w/fever, and subsequently found to have necrotic intraabdominal lymph nodes c/b sepsis.    -- stop Zosyn and start Levaquin (day 1, 7/15)  -- pain control w/bowel regimen  -- clarify Onc's plan/GOC with outpt provider  -- bladder scan for potential AUR  -- monitor BP (may be confounded by pain); Metop increased on this admission  -- DVT PPx - Lovenox  -- Dispo - TBD    Tegan Muñoz  [phone] Patient was seen and examined with the resident team today.  I agree with Dr. Cerrato' assessment and plan with the following exceptions/additions:     Briefly, this is a 83yo AA gentleman, former smoker, with a PMH of metastatic prostate cancer (on chemo, Dr. Jacome), enucleated eye s/p childhood trauma, HTN, HLD and poorly controlled NIDDM2 (A1c 9.5%) who p/w acute onset lower abdominal pain x 2-3 days associated w/fever, and subsequently found to have necrotic intraabdominal lymph nodes c/b BRIGID and sepsis.    -- stop Zosyn and start Levaquin (day 1, 7/15)  -- pain control w/bowel regimen  -- clarify Onc's plan/GOC with outpt provider  -- bladder scan for potential AUR  -- monitor BP (may be confounded by pain); Metop increased on this admission  -- DVT PPx - Lovenox  -- Dispo - TBD    Tegan Muñoz  [phone]

## 2019-07-15 NOTE — PROGRESS NOTE ADULT - SUBJECTIVE AND OBJECTIVE BOX
OVERNIGHT EVENTS: NAEO    SUBJECTIVE:    Vital Signs Last 12 Hrs  T(F): 98 (07-15-19 @ 04:51), Max: 98.6 (07-14-19 @ 20:29)  HR: 88 (07-15-19 @ 04:51) (75 - 88)  BP: 186/60 (07-15-19 @ 04:51) (159/62 - 186/60)  BP(mean): --  RR: 18 (07-15-19 @ 04:51) (17 - 18)  SpO2: 97% (07-15-19 @ 04:51) (96% - 97%)  I&O's Summary    14 Jul 2019 07:01  -  15 Jul 2019 06:32  --------------------------------------------------------  IN: 0 mL / OUT: 250 mL / NET: -250 mL        PHYSICAL EXAM:  Constitutional: NAD, comfortable in bed.  HEENT: NC/AT, PERRLA, EOMI, no conjunctival pallor or scleral icterus, MMM  Neck: Supple, no JVD  Respiratory: Normal rate, rhythm, depth, effort. CTAB. No w/r/r.   Cardiovascular: RRR, normal S1 and S2, no m/r/g.   Gastrointestinal: +BS, soft NTND, no guarding or rebound tenderness, no palpable masses   Extremities: wwp; no cyanosis, clubbing or edema.   Vascular: Pulses equal and strong throughout.   Neurological: AAOx3, no CN deficits, strength and sensation intact throughout.   Skin: No gross skin abnormalities or rashes        LABS:                        9.5    7.65  )-----------( 201      ( 14 Jul 2019 08:22 )             30.4     07-14    141  |  103  |  11  ----------------------------<  183<H>  3.9   |  23  |  1.12    Ca    9.0      14 Jul 2019 08:22  Phos  2.5     07-14  Mg     2.3     07-14    TPro  6.8  /  Alb  3.2<L>  /  TBili  0.5  /  DBili  x   /  AST  17  /  ALT  6<L>  /  AlkPhos  148<H>  07-13          RADIOLOGY & ADDITIONAL TESTS:    MEDICATIONS  (STANDING):  amLODIPine   Tablet 10 milliGRAM(s) Oral daily  dextrose 5%. 1000 milliLiter(s) (50 mL/Hr) IV Continuous <Continuous>  dextrose 50% Injectable 12.5 Gram(s) IV Push once  docusate sodium 100 milliGRAM(s) Oral daily  enoxaparin Injectable 40 milliGRAM(s) SubCutaneous every 24 hours  insulin lispro (HumaLOG) corrective regimen sliding scale   SubCutaneous Before meals and at bedtime  levothyroxine 37.5 MICROGram(s) Oral daily  metoprolol tartrate 25 milliGRAM(s) Oral two times a day  piperacillin/tazobactam IVPB.. 3.375 Gram(s) IV Intermittent every 6 hours  senna 2 Tablet(s) Oral at bedtime  simvastatin 20 milliGRAM(s) Oral at bedtime  tamsulosin 0.4 milliGRAM(s) Oral at bedtime    MEDICATIONS  (PRN):  acetaminophen   Tablet .. 650 milliGRAM(s) Oral every 6 hours PRN Temp greater or equal to 38C (100.4F)  acetaminophen   Tablet .. 650 milliGRAM(s) Oral every 6 hours PRN Mild Pain (1 - 3)  bisacodyl Suppository 10 milliGRAM(s) Rectal once PRN Constipation  dextrose 40% Gel 15 Gram(s) Oral once PRN Blood Glucose LESS THAN 70 milliGRAM(s)/deciliter  glucagon  Injectable 1 milliGRAM(s) IntraMuscular once PRN Glucose LESS THAN 70 milligrams/deciliter  oxyCODONE    5 mG/acetaminophen 325 mG 1 Tablet(s) Oral every 6 hours PRN Moderate Pain (4 - 6) OVERNIGHT EVENTS: [place] pt reported he did not sleep well because his bed was not cleaned    SUBJECTIVE:    Vital Signs Last 12 Hrs  T(F): 98 (07-15-19 @ 04:51), Max: 98.6 (07-14-19 @ 20:29)  HR: 88 (07-15-19 @ 04:51) (75 - 88)  BP: 186/60 (07-15-19 @ 04:51) (159/62 - 186/60)  BP(mean): --  RR: 18 (07-15-19 @ 04:51) (17 - 18)  SpO2: 97% (07-15-19 @ 04:51) (96% - 97%)  I&O's Summary    14 Jul 2019 07:01  -  15 Jul 2019 06:32  --------------------------------------------------------  IN: 0 mL / OUT: 250 mL / NET: -250 mL    PHYSICAL EXAM:  Constitutional: NAD, comfortable in bed.  HEENT: NC/AT, PERRLA, EOMI, no conjunctival pallor or scleral icterus, MMM  Neck: Supple, no JVD  Respiratory: Normal rate, rhythm, depth, effort. CTAB. No w/r/r.   Cardiovascular: RRR, normal S1 and S2, no m/r/g.   Gastrointestinal: +BS, soft NTND, no guarding or rebound tenderness, no palpable masses   Extremities: wwp; no cyanosis, clubbing or edema.   Vascular: Pulses equal and strong throughout.   Neurological: AAOx3, no CN deficits, strength and sensation intact throughout.   Skin: No gross skin abnormalities or rashes    LABS:                        9.5    7.65  )-----------( 201      ( 14 Jul 2019 08:22 )             30.4     07-14    141  |  103  |  11  ----------------------------<  183<H>  3.9   |  23  |  1.12    Ca    9.0      14 Jul 2019 08:22  Phos  2.5     07-14  Mg     2.3     07-14    TPro  6.8  /  Alb  3.2<L>  /  TBili  0.5  /  DBili  x   /  AST  17  /  ALT  6<L>  /  AlkPhos  148<H>  07-13    RADIOLOGY & ADDITIONAL TESTS:    MEDICATIONS  (STANDING):  amLODIPine   Tablet 10 milliGRAM(s) Oral daily  dextrose 5%. 1000 milliLiter(s) (50 mL/Hr) IV Continuous <Continuous>  dextrose 50% Injectable 12.5 Gram(s) IV Push once  docusate sodium 100 milliGRAM(s) Oral daily  enoxaparin Injectable 40 milliGRAM(s) SubCutaneous every 24 hours  insulin lispro (HumaLOG) corrective regimen sliding scale   SubCutaneous Before meals and at bedtime  levothyroxine 37.5 MICROGram(s) Oral daily  metoprolol tartrate 25 milliGRAM(s) Oral two times a day  piperacillin/tazobactam IVPB.. 3.375 Gram(s) IV Intermittent every 6 hours  senna 2 Tablet(s) Oral at bedtime  simvastatin 20 milliGRAM(s) Oral at bedtime  tamsulosin 0.4 milliGRAM(s) Oral at bedtime    MEDICATIONS  (PRN):  acetaminophen   Tablet .. 650 milliGRAM(s) Oral every 6 hours PRN Temp greater or equal to 38C (100.4F)  acetaminophen   Tablet .. 650 milliGRAM(s) Oral every 6 hours PRN Mild Pain (1 - 3)  bisacodyl Suppository 10 milliGRAM(s) Rectal once PRN Constipation  dextrose 40% Gel 15 Gram(s) Oral once PRN Blood Glucose LESS THAN 70 milliGRAM(s)/deciliter  glucagon  Injectable 1 milliGRAM(s) IntraMuscular once PRN Glucose LESS THAN 70 milligrams/deciliter  oxyCODONE    5 mG/acetaminophen 325 mG 1 Tablet(s) Oral every 6 hours PRN Moderate Pain (4 - 6) OVERNIGHT EVENTS: reilly ONTIVEROS reported he did not sleep well because his bed was not cleaned. Reports that he had the urge to urinate last night but did not make it to the bathroom and wet his bed. Says that it is normal for him to feel a sudden urge but reports that this is the first time he could not make it to the bathroom. This AM he says he had a bowel movement and reports that his abdominal pain and urinary urges have decreased since then. Yesterday PVR was ~125ml.    SUBJECTIVE:  Pain has improved, now 5/10 (10/10 on admission). Responds well to percocet.    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 CTAB. No w/r/r.   Cardiovascular: RRR, normal S1 and S2, no m/r/g.   Gastrointestinal: +BS, soft ND, RLQ tenderness to deep palpation, no suprapubic tenderness or fullness, no guarding or rebound tenderness, no palpable masses   Extremities: wwp; no cyanosis, clubbing or edema.   Vascular: Pulses equal and strong throughout.   Neurological: Alert, sensation intact throughout.     LABS:                        9.5    7.65  )-----------( 201      ( 14 Jul 2019 08:22 )             30.4     07-14    141  |  103  |  11  ----------------------------<  183<H>  3.9   |  23  |  1.12    Ca    9.0      14 Jul 2019 08:22  Phos  2.5     07-14  Mg     2.3     07-14    TPro  6.8  /  Alb  3.2<L>  /  TBili  0.5  /  DBili  x   /  AST  17  /  ALT  6<L>  /  AlkPhos  148<H>  07-13    RADIOLOGY & ADDITIONAL TESTS:    MEDICATIONS  (STANDING):  amLODIPine   Tablet 10 milliGRAM(s) Oral daily  dextrose 5%. 1000 milliLiter(s) (50 mL/Hr) IV Continuous <Continuous>  dextrose 50% Injectable 12.5 Gram(s) IV Push once  docusate sodium 100 milliGRAM(s) Oral daily  enoxaparin Injectable 40 milliGRAM(s) SubCutaneous every 24 hours  insulin lispro (HumaLOG) corrective regimen sliding scale   SubCutaneous Before meals and at bedtime  levothyroxine 37.5 MICROGram(s) Oral daily  metoprolol tartrate 25 milliGRAM(s) Oral two times a day  piperacillin/tazobactam IVPB.. 3.375 Gram(s) IV Intermittent every 6 hours  senna 2 Tablet(s) Oral at bedtime  simvastatin 20 milliGRAM(s) Oral at bedtime  tamsulosin 0.4 milliGRAM(s) Oral at bedtime    MEDICATIONS  (PRN):  acetaminophen   Tablet .. 650 milliGRAM(s) Oral every 6 hours PRN Temp greater or equal to 38C (100.4F)  acetaminophen   Tablet .. 650 milliGRAM(s) Oral every 6 hours PRN Mild Pain (1 - 3)  bisacodyl Suppository 10 milliGRAM(s) Rectal once PRN Constipation  dextrose 40% Gel 15 Gram(s) Oral once PRN Blood Glucose LESS THAN 70 milliGRAM(s)/deciliter  glucagon  Injectable 1 milliGRAM(s) IntraMuscular once PRN Glucose LESS THAN 70 milligrams/deciliter  oxyCODONE    5 mG/acetaminophen 325 mG 1 Tablet(s) Oral every 6 hours PRN Moderate Pain (4 - 6) OVERNIGHT EVENTS: [place] pt reported he did not sleep well because his bed was not cleaned. Reports that he had the urge to urinate last night but did not make it to the bathroom and wet his bed. Says that it is normal for him to feel a sudden urge but reports that this is the first time he could not make it to the bathroom. This AM he says he had a bowel movement and reports that his abdominal pain and urinary urges have decreased since then. Yesterday PVR was ~125ml.    Spoke with Dr. Grier' office today - Received chemo in ~2000 for prostate cancer with another physician (?); november 2017 - Jan 2018, was getting flutamide, from Dec 2018 until now getting Lupron from Dr. Grier. Also awaiting approval for clinical trial meds with Dr. Grier. Will have upcoming follow up with Dr. Grier upon [place]. Pt has not had a [facility] discussion with Dr. Grier ([phone], [facility]).    SUBJECTIVE:  Pain has improved, now 5/10 (10/10 on admission). Responds well to percocet.    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 CTAB. No w/r/r.   Cardiovascular: RRR, normal S1 and S2, no m/r/g.   Gastrointestinal: +BS, soft ND, RLQ tenderness to deep palpation, no suprapubic tenderness or fullness, no guarding or rebound tenderness, no palpable masses   Extremities: wwp; no cyanosis, clubbing or edema.   Vascular: Pulses equal and strong throughout.   Neurological: Alert, sensation intact throughout.     LABS:                        9.5    7.65  )-----------( 201      ( 14 Jul 2019 08:22 )             30.4     07-14    141  |  103  |  11  ----------------------------<  183<H>  3.9   |  23  |  1.12    Ca    9.0      14 Jul 2019 08:22  Phos  2.5     07-14  Mg     2.3     07-14    TPro  6.8  /  Alb  3.2<L>  /  TBili  0.5  /  DBili  x   /  AST  17  /  ALT  6<L>  /  AlkPhos  148<H>  07-13    RADIOLOGY & ADDITIONAL TESTS:    MEDICATIONS  (STANDING):  amLODIPine   Tablet 10 milliGRAM(s) Oral daily  dextrose 5%. 1000 milliLiter(s) (50 mL/Hr) IV Continuous <Continuous>  dextrose 50% Injectable 12.5 Gram(s) IV Push once  docusate sodium 100 milliGRAM(s) Oral daily  enoxaparin Injectable 40 milliGRAM(s) SubCutaneous every 24 hours  insulin lispro (HumaLOG) corrective regimen sliding scale   SubCutaneous Before meals and at bedtime  levothyroxine 37.5 MICROGram(s) Oral daily  metoprolol tartrate 25 milliGRAM(s) Oral two times a day  piperacillin/tazobactam IVPB.. 3.375 Gram(s) IV Intermittent every 6 hours  senna 2 Tablet(s) Oral at bedtime  simvastatin 20 milliGRAM(s) Oral at bedtime  tamsulosin 0.4 milliGRAM(s) Oral at bedtime    MEDICATIONS  (PRN):  acetaminophen   Tablet .. 650 milliGRAM(s) Oral every 6 hours PRN Temp greater or equal to 38C (100.4F)  acetaminophen   Tablet .. 650 milliGRAM(s) Oral every 6 hours PRN Mild Pain (1 - 3)  bisacodyl Suppository 10 milliGRAM(s) Rectal once PRN Constipation  dextrose 40% Gel 15 Gram(s) Oral once PRN Blood Glucose LESS THAN 70 milliGRAM(s)/deciliter  glucagon  Injectable 1 milliGRAM(s) IntraMuscular once PRN Glucose LESS THAN 70 milligrams/deciliter  oxyCODONE    5 mG/acetaminophen 325 mG 1 Tablet(s) Oral every 6 hours PRN Moderate Pain (4 - 6) OVERNIGHT EVENTS: [place] pt reported he did not sleep well because his bed was not cleaned. Reports that he had the urge to urinate last night but did not make it to the bathroom and wet his bed. Says that it is normal for him to feel a sudden urge but reports that this is the first time he could not make it to the bathroom. This AM he says he had a bowel movement and reports that his abdominal pain and urinary urges have decreased since then. Yesterday PVR was ~125ml.    Spoke with Dr. Grier' office today - Received chemo in ~2000 for prostate cancer with another physician (?); november 2017 - Jan 2018, was getting flutamide, from Dec 2018 until now getting Lupron from Dr. Grier. Also awaiting approval for clinical trial meds with Dr. Grier. Will have upcoming follow up with Dr. Grier upon [place]. Pt has not had a [facility] discussion with Dr. Grier ([phone], [facility]).    SUBJECTIVE:  Pain has improved, now 5/10 (10/10 on admission). Responds well to percocet.    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 CTAB. No w/r/r.   Cardiovascular: RRR, normal S1 and S2, no m/r/g.   Gastrointestinal: +BS, soft ND, RLQ tenderness to deep palpation, no suprapubic tenderness or fullness, no guarding or rebound tenderness, no palpable masses   Extremities: wwp; no cyanosis, clubbing or edema.   Vascular: Pulses equal and strong throughout.   Neurological: Alert, sensation intact throughout.     LABS:    7/14                      9.5    7.65  )-----------( 201      ( 14 Jul 2019 08:22 )             30.4     141  |  103  |  11  ----------------------------<  183<H>  3.9   |  23  |  1.12    Ca    9.0      14 Jul 2019 08:22  Phos  2.5     07-14  Mg     2.3     07-14    TPro  6.8  /  Alb  3.2<L>  /  TBili  0.5  /  DBili  x   /  AST  17  /  ALT  6<L>  /  AlkPhos  148<H>  07-13    RADIOLOGY & ADDITIONAL TESTS:    MEDICATIONS  (STANDING):  amLODIPine   Tablet 10 milliGRAM(s) Oral daily  dextrose 5%. 1000 milliLiter(s) (50 mL/Hr) IV Continuous <Continuous>  dextrose 50% Injectable 12.5 Gram(s) IV Push once  docusate sodium 100 milliGRAM(s) Oral daily  enoxaparin Injectable 40 milliGRAM(s) SubCutaneous every 24 hours  insulin lispro (HumaLOG) corrective regimen sliding scale   SubCutaneous Before meals and at bedtime  levothyroxine 37.5 MICROGram(s) Oral daily  metoprolol tartrate 25 milliGRAM(s) Oral two times a day  piperacillin/tazobactam IVPB.. 3.375 Gram(s) IV Intermittent every 6 hours  senna 2 Tablet(s) Oral at bedtime  simvastatin 20 milliGRAM(s) Oral at bedtime  tamsulosin 0.4 milliGRAM(s) Oral at bedtime    MEDICATIONS  (PRN):  acetaminophen   Tablet .. 650 milliGRAM(s) Oral every 6 hours PRN Temp greater or equal to 38C (100.4F)  acetaminophen   Tablet .. 650 milliGRAM(s) Oral every 6 hours PRN Mild Pain (1 - 3)  bisacodyl Suppository 10 milliGRAM(s) Rectal once PRN Constipation  dextrose 40% Gel 15 Gram(s) Oral once PRN Blood Glucose LESS THAN 70 milliGRAM(s)/deciliter  glucagon  Injectable 1 milliGRAM(s) IntraMuscular once PRN Glucose LESS THAN 70 milligrams/deciliter  oxyCODONE    5 mG/acetaminophen 325 mG 1 Tablet(s) Oral every 6 hours PRN Moderate Pain (4 - 6)

## 2019-07-15 NOTE — CHART NOTE - NSCHARTNOTEFT_GEN_A_CORE
Med rec with [facility] ([address])    7/9  metformin 500mg tid  levothyroxying 25mg, 1.5 once a day  tamsulosin bid      picked up 6/22  metoprolol ER 25mg daily   gabapentin 300mg 4pills mornign 4 at night    Simvastatin 20mg (last picked up march 3rd)

## 2019-07-16 ENCOUNTER — TRANSCRIPTION ENCOUNTER (OUTPATIENT)
Age: 84
End: 2019-07-16

## 2019-07-16 DIAGNOSIS — E87.6 HYPOKALEMIA: ICD-10-CM

## 2019-07-16 LAB
ANION GAP SERPL CALC-SCNC: 17 MMOL/L — SIGNIFICANT CHANGE UP (ref 5–17)
BUN SERPL-MCNC: 6 MG/DL — LOW (ref 7–23)
CALCIUM SERPL-MCNC: 9 MG/DL — SIGNIFICANT CHANGE UP (ref 8.4–10.5)
CHLORIDE SERPL-SCNC: 104 MMOL/L — SIGNIFICANT CHANGE UP (ref 96–108)
CO2 SERPL-SCNC: 21 MMOL/L — LOW (ref 22–31)
CREAT SERPL-MCNC: 0.89 MG/DL — SIGNIFICANT CHANGE UP (ref 0.5–1.3)
GLUCOSE SERPL-MCNC: 141 MG/DL — HIGH (ref 70–99)
HCT VFR BLD CALC: 26.1 % — LOW (ref 39–50)
HGB BLD-MCNC: 8.5 G/DL — LOW (ref 13–17)
MAGNESIUM SERPL-MCNC: 1.7 MG/DL — SIGNIFICANT CHANGE UP (ref 1.6–2.6)
MCHC RBC-ENTMCNC: 28.7 PG — SIGNIFICANT CHANGE UP (ref 27–34)
MCHC RBC-ENTMCNC: 32.6 GM/DL — SIGNIFICANT CHANGE UP (ref 32–36)
MCV RBC AUTO: 88.2 FL — SIGNIFICANT CHANGE UP (ref 80–100)
NRBC # BLD: 0 /100 WBCS — SIGNIFICANT CHANGE UP (ref 0–0)
PHOSPHATE SERPL-MCNC: 2.4 MG/DL — LOW (ref 2.5–4.5)
PLATELET # BLD AUTO: 236 K/UL — SIGNIFICANT CHANGE UP (ref 150–400)
POTASSIUM SERPL-MCNC: 3.8 MMOL/L — SIGNIFICANT CHANGE UP (ref 3.5–5.3)
POTASSIUM SERPL-SCNC: 3.8 MMOL/L — SIGNIFICANT CHANGE UP (ref 3.5–5.3)
RBC # BLD: 2.96 M/UL — LOW (ref 4.2–5.8)
RBC # FLD: 13.4 % — SIGNIFICANT CHANGE UP (ref 10.3–14.5)
SODIUM SERPL-SCNC: 142 MMOL/L — SIGNIFICANT CHANGE UP (ref 135–145)
WBC # BLD: 5.46 K/UL — SIGNIFICANT CHANGE UP (ref 3.8–10.5)
WBC # FLD AUTO: 5.46 K/UL — SIGNIFICANT CHANGE UP (ref 3.8–10.5)

## 2019-07-16 PROCEDURE — 99233 SBSQ HOSP IP/OBS HIGH 50: CPT | Mod: GC

## 2019-07-16 PROCEDURE — 99233 SBSQ HOSP IP/OBS HIGH 50: CPT

## 2019-07-16 RX ORDER — MAGNESIUM SULFATE 500 MG/ML
2 VIAL (ML) INJECTION ONCE
Refills: 0 | Status: COMPLETED | OUTPATIENT
Start: 2019-07-16 | End: 2019-07-16

## 2019-07-16 RX ORDER — OXYCODONE HYDROCHLORIDE 5 MG/1
10 TABLET ORAL EVERY 12 HOURS
Refills: 0 | Status: DISCONTINUED | OUTPATIENT
Start: 2019-07-16 | End: 2019-07-17

## 2019-07-16 RX ORDER — POTASSIUM PHOSPHATE, MONOBASIC POTASSIUM PHOSPHATE, DIBASIC 236; 224 MG/ML; MG/ML
30 INJECTION, SOLUTION INTRAVENOUS ONCE
Refills: 0 | Status: COMPLETED | OUTPATIENT
Start: 2019-07-16 | End: 2019-07-16

## 2019-07-16 RX ADMIN — OXYCODONE HYDROCHLORIDE 5 MILLIGRAM(S): 5 TABLET ORAL at 11:22

## 2019-07-16 RX ADMIN — Medication 50 GRAM(S): at 11:18

## 2019-07-16 RX ADMIN — Medication 25 MILLIGRAM(S): at 06:14

## 2019-07-16 RX ADMIN — ENOXAPARIN SODIUM 40 MILLIGRAM(S): 100 INJECTION SUBCUTANEOUS at 11:18

## 2019-07-16 RX ADMIN — OXYCODONE HYDROCHLORIDE 5 MILLIGRAM(S): 5 TABLET ORAL at 06:12

## 2019-07-16 RX ADMIN — OXYCODONE HYDROCHLORIDE 10 MILLIGRAM(S): 5 TABLET ORAL at 18:03

## 2019-07-16 RX ADMIN — OXYCODONE HYDROCHLORIDE 5 MILLIGRAM(S): 5 TABLET ORAL at 01:50

## 2019-07-16 RX ADMIN — OXYCODONE HYDROCHLORIDE 5 MILLIGRAM(S): 5 TABLET ORAL at 23:24

## 2019-07-16 RX ADMIN — Medication 25 MILLIGRAM(S): at 18:04

## 2019-07-16 RX ADMIN — LISINOPRIL 5 MILLIGRAM(S): 2.5 TABLET ORAL at 06:14

## 2019-07-16 RX ADMIN — OXYCODONE HYDROCHLORIDE 10 MILLIGRAM(S): 5 TABLET ORAL at 19:00

## 2019-07-16 RX ADMIN — POTASSIUM PHOSPHATE, MONOBASIC POTASSIUM PHOSPHATE, DIBASIC 83.33 MILLIMOLE(S): 236; 224 INJECTION, SOLUTION INTRAVENOUS at 11:18

## 2019-07-16 RX ADMIN — Medication 37.5 MICROGRAM(S): at 06:14

## 2019-07-16 RX ADMIN — SENNA PLUS 2 TABLET(S): 8.6 TABLET ORAL at 22:28

## 2019-07-16 RX ADMIN — AMLODIPINE BESYLATE 10 MILLIGRAM(S): 2.5 TABLET ORAL at 06:14

## 2019-07-16 RX ADMIN — OXYCODONE HYDROCHLORIDE 5 MILLIGRAM(S): 5 TABLET ORAL at 00:50

## 2019-07-16 RX ADMIN — Medication 100 MILLIGRAM(S): at 11:18

## 2019-07-16 RX ADMIN — SIMVASTATIN 20 MILLIGRAM(S): 20 TABLET, FILM COATED ORAL at 22:28

## 2019-07-16 RX ADMIN — OXYCODONE HYDROCHLORIDE 5 MILLIGRAM(S): 5 TABLET ORAL at 12:20

## 2019-07-16 RX ADMIN — TAMSULOSIN HYDROCHLORIDE 0.8 MILLIGRAM(S): 0.4 CAPSULE ORAL at 22:28

## 2019-07-16 RX ADMIN — OXYCODONE HYDROCHLORIDE 5 MILLIGRAM(S): 5 TABLET ORAL at 17:10

## 2019-07-16 RX ADMIN — OXYCODONE HYDROCHLORIDE 5 MILLIGRAM(S): 5 TABLET ORAL at 16:11

## 2019-07-16 NOTE — PROGRESS NOTE ADULT - SUBJECTIVE AND OBJECTIVE BOX
AMALIA CHAPMAN   MRN-[id number]    : 1935    HPI:  Patient is an 85 y/o male w hx of metastatic prostate cancer with (on chemotherapy), HTN, HLD, DM who presented to [facility] with lower abdominal pain x 2-3 days. He states that he has been having a severe cramping lower abdominal pain which was associated with constipation and one episode of vomiting yesterday AM, however he had 2 episodes of watery diarrhea after taking milk of magnesia at home. He denied any chills but on arrival to [facility] he was found to be febrile to 102.6. Of note he also complains of R leg pain and pain on the top of his head for the last 3 days, which he has never had before. In [facility] he had a CTAP which showed extensive metastatic disease as well as large necrotic lymph nodes in the RLQ and lower abdomen. He received 1 dose of CTX, morphine 4mgx2, NS 2200cc's, and was transferred to [facility]. (2019 00:24)    CTAP: reveals Extensive solid and necrotic lymphadenopathy throughout the abdomen and right pelvic sidewall Palliative Medicine consulted to assist with symptom management and GOC discussion       PAST MEDICAL & SURGICAL HISTORY:  History of eye prosthesis  T2DM (type 2 diabetes mellitus)  HLD (hyperlipidemia)  Essential hypertension  Prostate CA  S/P hernia repair    FAMILY HISTORY:  FH: ovarian cancer: mother; medical history of father is unknown    ROS:                     Dyspnea (Elizabeth 0-10): 0                       N/V (Y/N): N                              Secretions (Y/N) :  N              Agitation(Y/N): N  Pain (Y/N):  pt with c/o intermittent spontaneous sharp stabbing pain to RUQ and along pubis region. Unable to accurately utilize 0-10 pain scale originally stating pain at 10, but on further assessment he reports having medium pain. States pain exacerbates when he has the urge to urinate  and is much  relieved following urination and use of  with opioids.     General:  Denied  HEENT:    Denied  Neck:  Denied  CVS:  Denied  Resp:  Denied  GI:  intermittent constipation, abdominal pain    :  Denied  Musc:  Denied  Neuro:  Denied  Psych:  Denied  Skin:  Denied  Lymph:  Denied    Allergies    No Known Allergies    Intolerances    Opiate Naive (Y/N): N received Morphine in [facility], now on Percocet in hospital   -iStop reviewed (Y/N): Yes I Ref: [id number]	    Medications:      MEDICATIONS  (STANDING):  amLODIPine   Tablet 10 milliGRAM(s) Oral daily  dextrose 5%. 1000 milliLiter(s) (50 mL/Hr) IV Continuous <Continuous>  dextrose 50% Injectable 12.5 Gram(s) IV Push once  docusate sodium 100 milliGRAM(s) Oral daily  enoxaparin Injectable 40 milliGRAM(s) SubCutaneous every 24 hours  insulin lispro (HumaLOG) corrective regimen sliding scale   SubCutaneous Before meals and at bedtime  levoFLOXacin  Tablet 750 milliGRAM(s) Oral every 24 hours  levothyroxine 37.5 MICROGram(s) Oral daily  lisinopril 5 milliGRAM(s) Oral daily  metoprolol tartrate 25 milliGRAM(s) Oral two times a day  senna 2 Tablet(s) Oral at bedtime  simvastatin 20 milliGRAM(s) Oral at bedtime  tamsulosin 0.8 milliGRAM(s) Oral at bedtime    MEDICATIONS  (PRN):  acetaminophen   Tablet .. 650 milliGRAM(s) Oral every 6 hours PRN Temp greater or equal to 38C (100.4F)  acetaminophen   Tablet .. 650 milliGRAM(s) Oral every 6 hours PRN Mild Pain (1 - 3)  bisacodyl Suppository 10 milliGRAM(s) Rectal once PRN Constipation  dextrose 40% Gel 15 Gram(s) Oral once PRN Blood Glucose LESS THAN 70 milliGRAM(s)/deciliter  glucagon  Injectable 1 milliGRAM(s) IntraMuscular once PRN Glucose LESS THAN 70 milligrams/deciliter  oxyCODONE    5 mG/acetaminophen 325 mG 1 Tablet(s) Oral every 6 hours PRN Moderate Pain (4 - 6)      Labs:    CBC:                        9.5    5.64  )-----------( 240      ( 15 Jul 2019 12:51 )             29.8     CMP:    -15    138  |  101  |  7   ----------------------------<  115<H>  2.9<LL>   |  22  |  0.87    Ca    9.1      15 Jul 2019 12:51  Phos  1.8     07-15  Mg     2.0     07-15    Culture - Blood (19 @ 16:59)    Specimen Source: .Blood Blood-Peripheral    Culture Results:   No growth to date.    Urinalysis (19 @ 12:09)    Glucose Qualitative, Urine: 500    Blood, Urine: Trace    pH Urine: 7.0    Color: Yellow    Urine Appearance: Clear    Bilirubin: NEGATIVE    Ketone - Urine: Trace mg/dL    Specific Gravity: 1.010    Protein, Urine: 30 mg/dL    Urobilinogen: 0.2 E.U./dL    Nitrite: NEGATIVE    Leukocyte Esterase Concentration: NEGATIVE    Clostridium difficile Toxin by PCR (19 @ 03:02)    C Diff by PCR Result: NotDetec    Imaging:    EXAM:  CT ABDOMEN AND PELVIS IC                           PROCEDURE DATE:  2019        INTERPRETATION:  CLINICAL INFORMATION: Fever and diffuse lower abdominal   pain. 84 male pt, hx of HTN, HLD, DM, prostate CA, presents w worsening   lower abd pain since yesterday. Nausea, vomiting as well. on arrival is   having diarrhea episodes. watery brown. low grade temp oral. rectal to be   done now. States pain started last night and has gradually gotten worse.    COMPARISON: None.    PIMPRESSION:   1.  Extensive solid and necrotic lymphadenopathy throughout the abdomen   and right pelvic sidewall with a prominent right lower quadrant partially   necrotic lymph node with extensive surrounding fat stranding, which is   likely the etiology of patient's right-sided mild hydroureteronephrosis.   Correlate for UTI.   2.  There are extensive osseous metastases. These findings most likely   represent recurrence of patient's prostate cancer.  3.   Extensive atheromatous disease of the aortoiliac system and   branches. 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    PEx:  T(C): 37.3 (07-15-19 @ 09:10), Max: 37.3 (07-15-19 @ 09:10)  HR: 83 (07-15-19 @ 09:10) (75 - 88)  BP: 171/63 (07-15-19 @ 09:10) (159/62 - 186/60)  RR: 18 (07-15-19 @ 09:10) (17 - 18)  SpO2: 96% (07-15-19 @ 09:10) (94% - 97%)  Wt(kg): --75.7  CAPILLARY BLOOD GLUCOSE      POCT Blood Glucose.: 113 mg/dL (15 Jul 2019 12:34)    I&O's Summary    2019 07:01  -  15 Jul 2019 07:00  --------------------------------------------------------  IN: 0 mL / OUT: 250 mL / NET: -250 mL    General: elderly AA male in bed c/o abdominal pain   HEENT: NC/AT,  enucleated Left eye, poor dentition, MMM  Neck: supple, no JVD   CVS: S1S2 RRR no MRG  Resp: CTA B/L no RRW  GI: + ascites, hypoacttive BS x 4, pt states formed BM this AM    :  voiding freely  Musc:  MARCUM 5/ strenth    Neuro: AxOx3, no focal deficita  Psych:  calm and cooperative   Skin: no rashes  Lymph: No LAD  Preadmit Karnofsky: 40 %           Current Karnofsky:  30-40 %  Cachexia (Y/N): N  BMI: 21.4    Advanced Directives   DNR/DNI   MOLST       Decision maker: pt has capacity to make complex medical decisions, needs more simplistic explanations   Legal surrogate: Vipin Mullen (brother): [phone]    Social History: lives with adult son, has another son with whom he is estranged Bahai    GOALS OF CARE DISCUSSION       Palliative care info/counseling provided	           Family meeting       Advanced Directives addressed please see Advance Care Planning Note	           Documentation of GOC: pending primary team gaining collateral from oncology, pt states he had follow up with Dr Jacome from [facility] in AM  	          REFERRALS	        Palliative Med        Unit SW/Case Mgmt              Music Therapy      Nutrition      PT/OT AMALIA CHAPMAN   MRN-[id number]    : 1935    HPI:  Patient is an 83 y/o male w hx of metastatic prostate cancer with (on chemotherapy), HTN, HLD, DM who presented to [facility] with lower abdominal pain x 2-3 days. He states that he has been having a severe cramping lower abdominal pain which was associated with constipation and one episode of vomiting yesterday AM, however he had 2 episodes of watery diarrhea after taking milk of magnesia at home. He denied any chills but on arrival to [facility] he was found to be febrile to 102.6. Of note he also complains of R leg pain and pain on the top of his head for the last 3 days, which he has never had before. In [facility] he had a CTAP which showed extensive metastatic disease as well as large necrotic lymph nodes in the RLQ and lower abdomen. He received 1 dose of CTX, morphine 4mgx2, NS 2200cc's, and was transferred to [facility]. (2019 00:24)    CTAP: reveals Extensive solid and necrotic lymphadenopathy throughout the abdomen and right pelvic sidewall Palliative Medicine consulted to assist with symptom management and GOC discussion       Subjective: Pt in good spirits. States he had a BM this AM. Had one episode of severe pain last evening     ROS:                     Dyspnea (Elizabeth 0-10): 0                       N/V (Y/N): N                              Secretions (Y/N) :  N              Agitation(Y/N): N  Pain (Y/N):  pt with c/o intermittent spontaneous sharp stabbing pain to RUQ and along pubis region. Unable to accurately quantify states he has small to  medium pain. States pain exacerbates  after eating solids and when he has the urge to urinate  and is much  relieved following urination and use of  with opioids.     Allergies    No Known Allergies    Intolerances    Opiate Naive (Y/N): N received Morphine in [facility], now on Percocet in hospital   -iStop reviewed (Y/N): Yes I Ref: [id number]	    Medications:      MEDICATIONS  (STANDING):  amLODIPine   Tablet 10 milliGRAM(s) Oral daily  dextrose 5%. 1000 milliLiter(s) (50 mL/Hr) IV Continuous <Continuous>  dextrose 50% Injectable 12.5 Gram(s) IV Push once  docusate sodium 100 milliGRAM(s) Oral daily  enoxaparin Injectable 40 milliGRAM(s) SubCutaneous every 24 hours  insulin lispro (HumaLOG) corrective regimen sliding scale   SubCutaneous Before meals and at bedtime  levoFLOXacin  Tablet 750 milliGRAM(s) Oral every 24 hours  levothyroxine 37.5 MICROGram(s) Oral daily  lisinopril 5 milliGRAM(s) Oral daily  metoprolol tartrate 25 milliGRAM(s) Oral two times a day  oxyCODONE  ER Tablet 10 milliGRAM(s) Oral every 12 hours  senna 2 Tablet(s) Oral at bedtime  simvastatin 20 milliGRAM(s) Oral at bedtime  tamsulosin 0.8 milliGRAM(s) Oral at bedtime    MEDICATIONS  (PRN):  acetaminophen   Tablet .. 650 milliGRAM(s) Oral every 6 hours PRN Temp greater or equal to 38C (100.4F)  acetaminophen   Tablet .. 650 milliGRAM(s) Oral every 6 hours PRN Mild Pain (1 - 3)  bisacodyl Suppository 10 milliGRAM(s) Rectal once PRN Constipation  dextrose 40% Gel 15 Gram(s) Oral once PRN Blood Glucose LESS THAN 70 milliGRAM(s)/deciliter  glucagon  Injectable 1 milliGRAM(s) IntraMuscular once PRN Glucose LESS THAN 70 milligrams/deciliter  oxyCODONE    IR 5 milliGRAM(s) Oral every 4 hours PRN Moderate Pain (4 - 6)    Labs:                          8.5    5.46  )-----------( 236      ( 2019 08:16 )             26.1   07-16    142  |  104  |  6<L>  ----------------------------<  141<H>  3.8   |  21<L>  |  0.89    Ca    9.0      2019 08:16  Phos  2.4     -16  Mg     1.7     -16      Culture - Blood (19 @ 16:59)    Specimen Source: .Blood Blood-Peripheral    Culture Results:   No growth to date.    Urinalysis (19 @ 12:09)    Glucose Qualitative, Urine: 500    Blood, Urine: Trace    pH Urine: 7.0    Color: Yellow    Urine Appearance: Clear    Bilirubin: NEGATIVE    Ketone - Urine: Trace mg/dL    Specific Gravity: 1.010    Protein, Urine: 30 mg/dL    Urobilinogen: 0.2 E.U./dL    Nitrite: NEGATIVE    Leukocyte Esterase Concentration: NEGATIVE    Clostridium difficile Toxin by PCR (19 @ 03:02)    C Diff by PCR Result: NotDetec    Imaging:      EXAM:  XR ABDO-ACUTE ABD SRS W CXR 1 VW                          PROCEDURE DATE:  07/15/2019      INTERPRETATION:  Clinical history/reason for exam:Metastatic prostate   cancer with severe abdominal pain    Comparison: CT abdomen and pelvis 2019.    Procedure: Abdominal series including single view of the chest and   upright and supine views of the abdomen    Findings:  There is no focal consolidation, pleural effusion or pneumothorax.    There is a nonspecific bowel gas pattern with a paucity of air. There is   no free air under the diaphragm. There are degenerative changes of the   lumbar spine and the bilateral hips. There is no abnormal calcification.    Impression:  Nonspecific bowel gas pattern with a paucity of air.      EXAM:  CT ABDOMEN AND PELVIS IC                           PROCEDURE DATE:  2019        INTERPRETATION:  CLINICAL INFORMATION: Fever and diffuse lower abdominal   pain. 84 male pt, hx of HTN, HLD, DM, prostate CA, presents w worsening   lower abd pain since yesterday. Nausea, vomiting as well. on arrival is   having diarrhea episodes. watery brown. low grade temp oral. rectal to be   done now. States pain started last night and has gradually gotten worse.    COMPARISON: None.    PIMPRESSION:   1.  Extensive solid and necrotic lymphadenopathy throughout the abdomen   and right pelvic sidewall with a prominent right lower quadrant partially   necrotic lymph node with extensive surrounding fat stranding, which is   likely the etiology of patient's right-sided mild hydroureteronephrosis.   Correlate for UTI.   2.  There are extensive osseous metastases. These findings most likely   represent recurrence of patient's prostate cancer.  3.   Extensive atheromatous disease of the aortoiliac system and   branches. 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    PEx:  Vital Signs Last 24 Hrs  T(C): 36.6 (2019 08:50), Max: 36.8 (15 Jul 2019 20:40)  T(F): 97.9 (2019 08:50), Max: 98.3 (15 Jul 2019 20:40)  HR: 81 (2019 08:50) (81 - 95)  BP: 132/56 (2019 08:50) (132/56 - 186/66)  BP(mean): 97 (2019 06:09) (88 - 97)  RR: 18 (2019 08:50) (17 - 18)  SpO2: 98% (2019 08:50) (94% - 99%)    General: elderly AA male in bed In NAD  HEENT: NC/AT,  enucleated Left eye, poor dentition, MMM  Neck: supple, no JVD   CVS: S1S2 RRR no MRG  Resp: CTA B/L no RRW  GI: + ascites, hypoacttive BS x 4, pt states formed BM this AM    :  voiding freely  Musc:  MARCUM / strenth    Neuro: AxOx3, no focal deficita  Psych:  calm and cooperative   Skin: no rashes  Lymph: No LAD  Preadmit Karnofsky: 40 %           Current Karnofsky:  30-40 %  Cachexia (Y/N): N  BMI: 21.4    Advanced Directives   DNR/DNI   MOLST       Decision maker: pt has capacity to make complex medical decisions, needs more simplistic explanations   Legal surrogate: Vipin Mullen (brother): [phone]    Social History: lives with adult son, has another son with whom he is estranged [employer]    GOALS OF CARE DISCUSSION       Palliative care info/counseling provided	           Family meeting       Advanced Directives addressed please see Advance Care Planning Note	           Documentation of GOC: pending primary team gaining collateral from oncology, pt states he had follow up with Dr Jacome from [facility] this week to discuss "next steps"  	          REFERRALS	        Palliative Med        Unit SW/Case Mgmt              Music Therapy      Nutrition      PT/OT

## 2019-07-16 NOTE — DISCHARGE NOTE PROVIDER - NSDCCPCAREPLAN_GEN_ALL_CORE_FT
PRINCIPAL DISCHARGE DIAGNOSIS  Diagnosis: Abdominal pain  Assessment and Plan of Treatment: You were admitted because of your abdominal pain. We gave you antibiotics to treat a possible infection and pain medication to help you tolerate the pain. You remained stable while in the hospital and abdominal xrays did not show anything acutely concerning. You were discharged on antibiotics to continue to treat the infection.      SECONDARY DISCHARGE DIAGNOSES  Diagnosis: Sepsis  Assessment and Plan of Treatment: You were admitted with a fever, tachycardia, and possible lymph node infection and were treated with antibiotics for a possible systemic infection. After admission, you did not have any more fevers and were discharged on antibiotics to continue infection treatment outpatient.    Diagnosis: Urinary retention  Assessment and Plan of Treatment: While in the hospital you complained of some trouble urinating and incontinence. We started your home dose of flomax and performed ultrasound exams to make sure you were not retaining urine. After these exams, you reported normal urination and had no other urinary complaints.    Diagnosis: Hypokalemia  Assessment and Plan of Treatment: During your stay, your bloodwork showed low potassium which was corrected with oral medication PRINCIPAL DISCHARGE DIAGNOSIS  Diagnosis: Abdominal pain  Assessment and Plan of Treatment: You were admitted because of your abdominal pain. We gave you antibiotics to treat a possible infection and pain medication to help you tolerate the pain. You remained stable while in the hospital and abdominal xrays did not show anything acutely concerning. You were discharged on antibiotics to continue to treat the infection.      SECONDARY DISCHARGE DIAGNOSES  Diagnosis: Diabetes mellitus with hyperglycemia  Assessment and Plan of Treatment: You had high sugars inpatient, we treated you with insulin to manage it.    Diagnosis: Sepsis  Assessment and Plan of Treatment: You were admitted with a fever, tachycardia, and possible lymph node infection and were treated with antibiotics for a possible systemic infection. After admission, you did not have any more fevers and were discharged on antibiotics to continue infection treatment outpatient.    Diagnosis: Urinary retention  Assessment and Plan of Treatment: While in the hospital you complained of some trouble urinating and incontinence. We started your home dose of flomax and performed ultrasound exams to make sure you were not retaining urine. After these exams, you reported normal urination and had no other urinary complaints.    Diagnosis: Hypokalemia  Assessment and Plan of Treatment: During your stay, your bloodwork showed low potassium which was corrected with oral medication

## 2019-07-16 NOTE — PROGRESS NOTE ADULT - ATTENDING COMMENTS
Patient was seen and examined with the resident team today.  I agree with Dr. Cerrato' assessment and plan with the following exceptions/additions:     Briefly, this is a 85yo AA gentleman, former smoker, with a PMH of metastatic prostate cancer (on chemo, Dr. Jacome), enucleated eye s/p childhood trauma, HTN, HLD and poorly controlled NIDDM2 (A1c 9.5%) who p/w acute onset lower abdominal pain x 2-3 days associated w/fever, and subsequently found to have necrotic intraabdominal lymph nodes c/b BRIGID and sepsis (present on admission and ruled in).    -- c/w Levaquin 750mg daily (s/p 2 days of Zosyn; started on 7/15)  -- pain control w/bowel regimen; will need scripts for new Pall Care regimen  -- given exam is at times disproportionate to CTAP, will consult Surgery to r/o acute process  -- advance diet as tolerated  -- monitor for signs of bleed given Hb drop this AM (down 1U)  -- monitor BP (may be confounded by pain); Metop increased on this admission  -- will need GOC discussion w/outpatient oncologist following discharge  -- DVT PPx - Lovenox  -- Dispo - TBD    Tegan Muñoz  [phone]

## 2019-07-16 NOTE — DISCHARGE NOTE PROVIDER - HOSPITAL COURSE
Mr. Laguna is an 83 yo M pmh metastatis prostate cancer, htn, hld, dm, who was admitted for lower abdominal pain, diarrhea and fever. On admission, Mr. Laguna was found to have necrotic lymph nodes on CT scan and was treated with antibiotics for possible infection. While in the hospital, his diarrhea resolved and his abdominal pain responded well to pain medication and antibiotics, athough he continued to complain of persistend lower abdominal pain. He was discharged on oral levaquin and instructed to follow up with his hematologist/oncologist, Dr. Nicolas Grier. Patient was examined at bedside and his hospital stay and plan were discussed with the medical team, patient displayed understanding of his condition and plan. Mr. Laguna is an 85 yo M pmh metastatis prostate cancer, htn, hld, dm, who was admitted for lower abdominal pain, diarrhea and fever. On admission, Mr. Laguna was found to have necrotic lymph nodes on CT scan and was treated with antibiotics for possible infection. While in the hospital, his diarrhea resolved and his abdominal pain responded well to pain medication and antibiotics, athough he continued to complain of persistend lower abdominal pain. He was also seen by surgical teams and acute abdominal proccesses were ruled out. He was discharged on oral levaquin and instructed to follow up with his hematologist/oncologist, Dr. Nicolas Grier. Patient was examined at bedside and his hospital stay and plan were discussed with the medical team, patient displayed understanding of his condition and plan.

## 2019-07-16 NOTE — PROGRESS NOTE ADULT - PROBLEM SELECTOR PLAN 5
treated by Dr Jacome at [facility].  Pt with poor knowledge of his previous tx. Med team to reach out to obtain collateral.

## 2019-07-16 NOTE — DISCHARGE NOTE PROVIDER - PROVIDER TOKENS
FREE:[LAST:[Grier],FIRST:[Nicolas],PHONE:[phone]],FAX:[(   )    -],ADDRESS:[[address] [place] [address]],FOLLOWUP:[1 week]]

## 2019-07-16 NOTE — PROGRESS NOTE ADULT - ASSESSMENT
83 y/o male w hx of metastatic prostate cancer to (on chemotherapy), HTN, HLD, DM who presented to [facility] with lower abdominal pain x 2-3 days. He states that he has been having a severe cramping lower abdominal pain which was associated with constipation and one episode of vomiting

## 2019-07-16 NOTE — PROGRESS NOTE ADULT - ASSESSMENT
Patient is an 85 y/o male w hx of metastatic prostate cancer (on chemotherapy), HTN, HLD, DM who presented to [facility] with lower abdominal pain x 2-3 days, febrile in ED and found to have necrotic intraabdominal lymph nodes w fat stranding. Pain responding to medical managment and after BMs, on zosyn currently, has been afebrile since admission.

## 2019-07-16 NOTE — PROGRESS NOTE ADULT - PROBLEM SELECTOR PLAN 8
- pvr ~125ml (7/14) and ~150 on 7/15  - f/u bladder scan today, if increased (>300) consider straight cath for more accurate measurement  - flomax 0.8mg at bedtime (up from .4mg) - pvr ~125ml (7/14) and ~150 on 7/15  - flomax 0.8mg at bedtime (up from .4mg)  - monitor for urinary retention

## 2019-07-16 NOTE — CONSULT NOTE ADULT - SUBJECTIVE AND OBJECTIVE BOX
Attending: Narinder    HPI: 84M PMHx metastatic prostate cancer on chemotherapy, HTN, HLD, DM, admitted from [facility] for crampy LLQ abdominal pain x 2-3 days, nonradiating, no aggravating or alleviating factors, associated with mild constipation, for which he took milk of magnesia with relief. At [facility], febrile to 102.6, tachy to 104, /76, initial H&H 11/32.9; given Zosyn, ceftriaxone, and converted to Levaquin here. During admission here, his pain has slowly improved, and now is an 7-8/10, localized to the RLQ and LLQ. Afebrile but H&H has been downtrending, today 8.5. General surgery consulted to evaluate for acute abdomen.    PMH: as above, plus hypothyroidism  PSH: hernia repair  Meds: prednisone 20mg, albuterol, metformin, simvastatin, flomax, amlodipine, metoprolol, aithromycin, levothyroxine  Allerg: NKDA  SH: former smoker, social drinker, no other drug use, lives with son  FH: noncontributory    PAST MEDICAL & SURGICAL HISTORY:  History of eye prosthesis  T2DM (type 2 diabetes mellitus)  HLD (hyperlipidemia)  Essential hypertension  Prostate CA  S/P hernia repair      MEDICATIONS  (STANDING):  amLODIPine   Tablet 10 milliGRAM(s) Oral daily  dextrose 5%. 1000 milliLiter(s) (50 mL/Hr) IV Continuous <Continuous>  dextrose 50% Injectable 12.5 Gram(s) IV Push once  docusate sodium 100 milliGRAM(s) Oral daily  enoxaparin Injectable 40 milliGRAM(s) SubCutaneous every 24 hours  insulin lispro (HumaLOG) corrective regimen sliding scale   SubCutaneous Before meals and at bedtime  levoFLOXacin  Tablet 750 milliGRAM(s) Oral every 24 hours  levothyroxine 37.5 MICROGram(s) Oral daily  lisinopril 5 milliGRAM(s) Oral daily  metoprolol tartrate 25 milliGRAM(s) Oral two times a day  oxyCODONE  ER Tablet 10 milliGRAM(s) Oral every 12 hours  senna 2 Tablet(s) Oral at bedtime  simvastatin 20 milliGRAM(s) Oral at bedtime  tamsulosin 0.8 milliGRAM(s) Oral at bedtime    MEDICATIONS  (PRN):  acetaminophen   Tablet .. 650 milliGRAM(s) Oral every 6 hours PRN Temp greater or equal to 38C (100.4F)  acetaminophen   Tablet .. 650 milliGRAM(s) Oral every 6 hours PRN Mild Pain (1 - 3)  bisacodyl Suppository 10 milliGRAM(s) Rectal once PRN Constipation  dextrose 40% Gel 15 Gram(s) Oral once PRN Blood Glucose LESS THAN 70 milliGRAM(s)/deciliter  glucagon  Injectable 1 milliGRAM(s) IntraMuscular once PRN Glucose LESS THAN 70 milligrams/deciliter  oxyCODONE    IR 5 milliGRAM(s) Oral every 4 hours PRN Moderate Pain (4 - 6)      Allergies    No Known Allergies    Intolerances        FAMILY HISTORY:  FH: ovarian cancer: mother; medical history of father is unknown      ROS: otherwise negative.    Vital Signs Last 24 Hrs  T(C): 36.6 (16 Jul 2019 15:59), Max: 36.8 (15 Jul 2019 20:40)  T(F): 97.9 (16 Jul 2019 15:59), Max: 98.3 (15 Jul 2019 20:40)  HR: 79 (16 Jul 2019 15:59) (79 - 95)  BP: 150/68 (16 Jul 2019 15:59) (132/56 - 186/66)  BP(mean): 97 (16 Jul 2019 06:09) (88 - 97)  RR: 18 (16 Jul 2019 15:59) (17 - 18)  SpO2: 98% (16 Jul 2019 15:59) (94% - 98%)    I&O's Summary      Physical Exam:  General: NAD, resting comfortably in bed  HEENT: MMM  Pulmonary: nonlabored breathing, normal resp effort  Cardiovascular: RRR  Abdominal: soft, nondistended, mild LLQ and RLQ tenderness  Extremities: WWP, no edema, no calf tenderness  Neuro: no focal deficits  Psych: pleasant    LABS:                        8.5    5.46  )-----------( 236      ( 16 Jul 2019 08:16 )             26.1     07-16    142  |  104  |  6<L>  ----------------------------<  141<H>  3.8   |  21<L>  |  0.89    Ca    9.0      16 Jul 2019 08:16  Phos  2.4     07-16  Mg     1.7     07-16          CAPILLARY BLOOD GLUCOSE      POCT Blood Glucose.: 109 mg/dL (16 Jul 2019 17:38)  POCT Blood Glucose.: 142 mg/dL (16 Jul 2019 12:01)  POCT Blood Glucose.: 147 mg/dL (16 Jul 2019 08:28)  POCT Blood Glucose.: 144 mg/dL (15 Jul 2019 22:21)        Cultures:      RADIOLOGY & ADDITIONAL STUDIES:  < from: CT Abdomen and Pelvis w/ IV Cont (07.12.19 @ 12:00) >  BOWEL: Small hiatal hernia. Extensive colonic diverticulosis. No bowel   obstruction. Appendix not well-visualized but no compelling evidence of   appendicitis.  PERITONEUM: Small ascites.  VESSELS:  Dense of calcified and soft plaque aorta. 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  RETROPERITONEUM: 7 cm partially necrotic lymph node right lower quadrant   with extensive soft tissue stranding. 4.6 x 4.2 cm necrotic portacaval   lymph node. Multiple solid and necrotic retroperitoneal lymph nodes,   measuring up to 1.8 cm. Pelvic and perirectal lymph nodes measuring up to   1.8 cm.  ABDOMINAL WALL: Small umbilical and bilateral inguinal hernias.   BONES: Sclerotic lesions right scapula, multiple ribs, multiple vertebrae   and pelvic bones compatible with osteoblastic metastasis.    < end of copied text >      Assessment: 84M PMHx metastatic prostate cancer on chemotherapy, DM, HTN, HLD a/w abdominal pain likely 2/2 lymphadenitis, slowly improving since admission. General surgery consulted to evaluate surgical causes of pain. Unlikely mesenteric ischemia given appearance on CT scan, no concerning history, pain NOT out of proportion to exam. Risk factors: cancer, atherosclerosis on CT scan. Clinical picture limited as this is our first consult and first time examining this patient and medicine plans to discharge today.    Recommendations:  - repeat lactate  - if suspicion for mesenteric ischemia from medicine service high, recommend CTA to evaluate mesenteric vessels  - Team 4C will continue to follow while admitted  - discussed with chief on call, attending surgeon

## 2019-07-16 NOTE — PROGRESS NOTE ADULT - PROBLEM SELECTOR PLAN 1
pt with one episode of severe pain last night following his dinner  Relieved with dose of Oxycodone.   used 3 doses in past 24 hrs  starting Oxycontin 10 mg po q12 this AM  continue Oxycodone 5 mg po q 4 hr pen moderate pain  may consider PPI

## 2019-07-16 NOTE — PROGRESS NOTE ADULT - PROBLEM SELECTOR PLAN 9
1) PCP Contacted on Admission: (Y/N) --> Name & Phone #:  2) Date of Contact with PCP:  3) PCP Contacted at Discharge: (Y/N, N/A)  4) Summary of Handoff Given to PCP:   5) Post-Discharge Appointment Date and Location: Dr. Grier ([phone], [facility])

## 2019-07-16 NOTE — PROGRESS NOTE ADULT - PROBLEM SELECTOR PLAN 2
Pt reports medium sized brown formed BM this AM  continue Senna/Colace, dulcolax supp prn  must be diligent to assess for s/s OIC.

## 2019-07-16 NOTE — PROGRESS NOTE ADULT - PROBLEM SELECTOR PLAN 1
possible mesenteric lymphadenitis. UA negative (7/12); blood cultx ngtd (7/12), GI pcr negative (7/13), cxr negative. Afebrile since admission  - transitioned to oral levaquin 750mg PO qd

## 2019-07-16 NOTE — PROGRESS NOTE ADULT - PROBLEM SELECTOR PLAN 7
Support provided to patient and family. Patient to have access to supportive services during rest of hospital stay as the patient/family deemed necessary ie. Chaplaincy, Massage therapy, Music therapy, Patient and family supportive services, Palliative SW, etc.  As discussed during the palliative IDT meeting, the patients PSSA screening did not identify any current psychosocial need or spiritual support deficits.

## 2019-07-16 NOTE — PROGRESS NOTE ADULT - SUBJECTIVE AND OBJECTIVE BOX
OVERNIGHT EVENTS:   Had 1 episode of severe abdominal pain yesterday, patient was in tears and abd was diffusely in pain, mostly lower abd. Pain responded very well to 5mg oxycodone, patient was comfortable, conversing with son, and arranging details for follow up appts. Abdominal xray negative for free air    Increased flomax to home dose of 0.8mg at bedtime. PVR yesterday was ~150ml. Potassium up to 3.9 after repletion yesterday.    SUBJECTIVE:     Vital Signs Last 12 Hrs  T(F): 98.3 (07-16-19 @ 05:36), Max: 98.3 (07-15-19 @ 20:40)  HR: 95 (07-16-19 @ 05:36) (83 - 95)  BP: 186/66 (07-16-19 @ 05:36) (134/66 - 186/66)  BP(mean): 88 (07-15-19 @ 20:40) (88 - 88)  RR: 18 (07-16-19 @ 05:36) (17 - 18)  SpO2: 94% (07-16-19 @ 05:36) (94% - 98%)  I&O's Summary    14 Jul 2019 07:01  -  15 Jul 2019 07:00  --------------------------------------------------------  IN: 0 mL / OUT: 250 mL / NET: -250 mL      PHYSICAL EXAM:  Constitutional: NAD, comfortable in bed.  HEENT: NC/AT, PERRLA, EOMI, no conjunctival pallor or scleral icterus, MMM  Neck: Supple, no JVD  Respiratory: Normal rate, rhythm, depth, effort. CTAB. No w/r/r.   Cardiovascular: RRR, normal S1 and S2, no m/r/g.   Gastrointestinal: +BS, soft NTND, no guarding or rebound tenderness, no palpable masses   Extremities: wwp; no cyanosis, clubbing or edema.   Vascular: Pulses equal and strong throughout.   Neurological: AAOx3, no CN deficits, strength and sensation intact throughout.   Skin: No gross skin abnormalities or rashes      LABS:                        9.5    5.64  )-----------( 240      ( 15 Jul 2019 12:51 )             29.8     07-15    140  |  101  |  7   ----------------------------<  132<H>  3.9   |  23  |  0.92    Ca    8.9      15 Jul 2019 20:28  Phos  1.8     07-15  Mg     2.0     07-15    RADIOLOGY & ADDITIONAL TESTS:    MEDICATIONS  (STANDING):  amLODIPine   Tablet 10 milliGRAM(s) Oral daily  dextrose 5%. 1000 milliLiter(s) (50 mL/Hr) IV Continuous <Continuous>  dextrose 50% Injectable 12.5 Gram(s) IV Push once  docusate sodium 100 milliGRAM(s) Oral daily  enoxaparin Injectable 40 milliGRAM(s) SubCutaneous every 24 hours  insulin lispro (HumaLOG) corrective regimen sliding scale   SubCutaneous Before meals and at bedtime  levoFLOXacin  Tablet 750 milliGRAM(s) Oral every 24 hours  levothyroxine 37.5 MICROGram(s) Oral daily  lisinopril 5 milliGRAM(s) Oral daily  metoprolol tartrate 25 milliGRAM(s) Oral two times a day  senna 2 Tablet(s) Oral at bedtime  simvastatin 20 milliGRAM(s) Oral at bedtime  tamsulosin 0.8 milliGRAM(s) Oral at bedtime    MEDICATIONS  (PRN):  acetaminophen   Tablet .. 650 milliGRAM(s) Oral every 6 hours PRN Temp greater or equal to 38C (100.4F)  acetaminophen   Tablet .. 650 milliGRAM(s) Oral every 6 hours PRN Mild Pain (1 - 3)  bisacodyl Suppository 10 milliGRAM(s) Rectal once PRN Constipation  dextrose 40% Gel 15 Gram(s) Oral once PRN Blood Glucose LESS THAN 70 milliGRAM(s)/deciliter  glucagon  Injectable 1 milliGRAM(s) IntraMuscular once PRN Glucose LESS THAN 70 milligrams/deciliter  oxyCODONE    IR 5 milliGRAM(s) Oral every 4 hours PRN Moderate Pain (4 - 6) OVERNIGHT EVENTS:   Had 1 episode of severe abdominal pain yesterday, patient was in tears and abd was diffusely in pain, mostly lower abd. Pain responded very well to 5mg oxycodone, patient was comfortable, conversing with son, and arranging details for follow up appts. Abdominal xray negative for free air.    Increased flomax to home dose of 0.8mg at bedtime. PVR yesterday was ~150ml. Potassium up to 3.9 after repletion yesterday.    SUBJECTIVE: Feels fine today, reports one BM yesterday that was normal (soft solid), reports urinating normally. Says that his abdominal pain is feeling better. No nausea or vomiting but says that he cannot eat a lot of solid foods due to linger pain abdominal pain.    Vital Signs Last 12 Hrs  T(F): 98.3 (07-16-19 @ 05:36), Max: 98.3 (07-15-19 @ 20:40)  HR: 95 (07-16-19 @ 05:36) (83 - 95)  BP: 186/66 (07-16-19 @ 05:36) (134/66 - 186/66)  BP(mean): 88 (07-15-19 @ 20:40) (88 - 88)  RR: 18 (07-16-19 @ 05:36) (17 - 18)  SpO2: 94% (07-16-19 @ 05:36) (94% - 98%)  I&O's Summary    14 Jul 2019 07:01  -  15 Jul 2019 07:00  --------------------------------------------------------  IN: 0 mL / OUT: 250 mL / NET: -250 mL      PHYSICAL EXAM:  Constitutional: NAD, comfortable in bed, polite, calm cooperative.  HEENT: no conjunctival pallor or scleral icterus, MMM  Respiratory: Normal rate, rhythm, depth, effort. CTAB. No w/r/r.   Cardiovascular: RRR, normal S1 and S2, no m/r/g.   Gastrointestinal: +BS, soft ND, diffusely tender on exam lower abd>upper, no palpable masses   Extremities: wwp; no cyanosis, clubbing or edema.   Vascular: Pulses equal and strong throughout.   Neurological: Alert.       LABS:                        9.5    5.64  )-----------( 240      ( 15 Jul 2019 12:51 )             29.8     07-15    140  |  101  |  7   ----------------------------<  132<H>  3.9   |  23  |  0.92    Ca    8.9      15 Jul 2019 20:28  Phos  1.8     07-15  Mg     2.0     07-15    RADIOLOGY & ADDITIONAL TESTS:    MEDICATIONS  (STANDING):  amLODIPine   Tablet 10 milliGRAM(s) Oral daily  dextrose 5%. 1000 milliLiter(s) (50 mL/Hr) IV Continuous <Continuous>  dextrose 50% Injectable 12.5 Gram(s) IV Push once  docusate sodium 100 milliGRAM(s) Oral daily  enoxaparin Injectable 40 milliGRAM(s) SubCutaneous every 24 hours  insulin lispro (HumaLOG) corrective regimen sliding scale   SubCutaneous Before meals and at bedtime  levoFLOXacin  Tablet 750 milliGRAM(s) Oral every 24 hours  levothyroxine 37.5 MICROGram(s) Oral daily  lisinopril 5 milliGRAM(s) Oral daily  metoprolol tartrate 25 milliGRAM(s) Oral two times a day  senna 2 Tablet(s) Oral at bedtime  simvastatin 20 milliGRAM(s) Oral at bedtime  tamsulosin 0.8 milliGRAM(s) Oral at bedtime    MEDICATIONS  (PRN):  acetaminophen   Tablet .. 650 milliGRAM(s) Oral every 6 hours PRN Temp greater or equal to 38C (100.4F)  acetaminophen   Tablet .. 650 milliGRAM(s) Oral every 6 hours PRN Mild Pain (1 - 3)  bisacodyl Suppository 10 milliGRAM(s) Rectal once PRN Constipation  dextrose 40% Gel 15 Gram(s) Oral once PRN Blood Glucose LESS THAN 70 milliGRAM(s)/deciliter  glucagon  Injectable 1 milliGRAM(s) IntraMuscular once PRN Glucose LESS THAN 70 milligrams/deciliter  oxyCODONE    IR 5 milliGRAM(s) Oral every 4 hours PRN Moderate Pain (4 - 6)

## 2019-07-16 NOTE — DISCHARGE NOTE PROVIDER - NSDCCPTREATMENT_GEN_ALL_CORE_FT
PRINCIPAL PROCEDURE  Procedure: XR acute abdominal series  Findings and Treatment: Findings:  There is no focal consolidation, pleural effusion or pneumothorax.  There is a nonspecific bowel gas pattern with apaucity of air. There is   no free air under the diaphragm. There are degenerative changes of the   lumbar spine and the bilateral hips. There is no abnormal calcification.  Impression:  Nonspecific bowel gas pattern with a paucity of air.        SECONDARY PROCEDURE  Procedure: Chest xray, PA & lateral  Findings and Treatment: INTERPRETATION:  Clinical History: Sepsis  Portable examination of the chest demonstrates the heart to be within   normal limits in transverse diameter. Noacute infiltrates. Mild   levoscoliosis thoracic spine. Limited inspiration.  Impression: No acute infiltrates

## 2019-07-17 ENCOUNTER — TRANSCRIPTION ENCOUNTER (OUTPATIENT)
Age: 84
End: 2019-07-17

## 2019-07-17 VITALS
SYSTOLIC BLOOD PRESSURE: 154 MMHG | TEMPERATURE: 98 F | OXYGEN SATURATION: 96 % | HEART RATE: 81 BPM | RESPIRATION RATE: 18 BRPM | DIASTOLIC BLOOD PRESSURE: 64 MMHG

## 2019-07-17 DIAGNOSIS — D64.9 ANEMIA, UNSPECIFIED: ICD-10-CM

## 2019-07-17 LAB
CULTURE RESULTS: SIGNIFICANT CHANGE UP
CULTURE RESULTS: SIGNIFICANT CHANGE UP
LACTATE SERPL-SCNC: 0.9 MMOL/L — SIGNIFICANT CHANGE UP (ref 0.5–2)
SPECIMEN SOURCE: SIGNIFICANT CHANGE UP
SPECIMEN SOURCE: SIGNIFICANT CHANGE UP

## 2019-07-17 PROCEDURE — 82962 GLUCOSE BLOOD TEST: CPT

## 2019-07-17 PROCEDURE — 84100 ASSAY OF PHOSPHORUS: CPT

## 2019-07-17 PROCEDURE — 85025 COMPLETE CBC W/AUTO DIFF WBC: CPT

## 2019-07-17 PROCEDURE — 83605 ASSAY OF LACTIC ACID: CPT

## 2019-07-17 PROCEDURE — 83735 ASSAY OF MAGNESIUM: CPT

## 2019-07-17 PROCEDURE — 87040 BLOOD CULTURE FOR BACTERIA: CPT

## 2019-07-17 PROCEDURE — 96375 TX/PRO/DX INJ NEW DRUG ADDON: CPT

## 2019-07-17 PROCEDURE — 80048 BASIC METABOLIC PNL TOTAL CA: CPT

## 2019-07-17 PROCEDURE — 87493 C DIFF AMPLIFIED PROBE: CPT

## 2019-07-17 PROCEDURE — 96376 TX/PRO/DX INJ SAME DRUG ADON: CPT

## 2019-07-17 PROCEDURE — 71045 X-RAY EXAM CHEST 1 VIEW: CPT

## 2019-07-17 PROCEDURE — 96374 THER/PROPH/DIAG INJ IV PUSH: CPT | Mod: XU

## 2019-07-17 PROCEDURE — 86901 BLOOD TYPING SEROLOGIC RH(D): CPT

## 2019-07-17 PROCEDURE — 87507 IADNA-DNA/RNA PROBE TQ 12-25: CPT

## 2019-07-17 PROCEDURE — 93005 ELECTROCARDIOGRAM TRACING: CPT

## 2019-07-17 PROCEDURE — 83036 HEMOGLOBIN GLYCOSYLATED A1C: CPT

## 2019-07-17 PROCEDURE — 87046 STOOL CULTR AEROBIC BACT EA: CPT

## 2019-07-17 PROCEDURE — 82803 BLOOD GASES ANY COMBINATION: CPT

## 2019-07-17 PROCEDURE — 85027 COMPLETE CBC AUTOMATED: CPT

## 2019-07-17 PROCEDURE — 74022 RADEX COMPL AQT ABD SERIES: CPT

## 2019-07-17 PROCEDURE — 87045 FECES CULTURE AEROBIC BACT: CPT

## 2019-07-17 PROCEDURE — 74177 CT ABD & PELVIS W/CONTRAST: CPT

## 2019-07-17 PROCEDURE — 86900 BLOOD TYPING SEROLOGIC ABO: CPT

## 2019-07-17 PROCEDURE — 80053 COMPREHEN METABOLIC PANEL: CPT

## 2019-07-17 PROCEDURE — 99239 HOSP IP/OBS DSCHRG MGMT >30: CPT | Mod: GC

## 2019-07-17 PROCEDURE — 36415 COLL VENOUS BLD VENIPUNCTURE: CPT

## 2019-07-17 PROCEDURE — 94640 AIRWAY INHALATION TREATMENT: CPT

## 2019-07-17 PROCEDURE — 99233 SBSQ HOSP IP/OBS HIGH 50: CPT

## 2019-07-17 PROCEDURE — 99285 EMERGENCY DEPT VISIT HI MDM: CPT | Mod: 25

## 2019-07-17 PROCEDURE — 81001 URINALYSIS AUTO W/SCOPE: CPT

## 2019-07-17 PROCEDURE — 83880 ASSAY OF NATRIURETIC PEPTIDE: CPT

## 2019-07-17 PROCEDURE — 86850 RBC ANTIBODY SCREEN: CPT

## 2019-07-17 PROCEDURE — 84484 ASSAY OF TROPONIN QUANT: CPT

## 2019-07-17 RX ORDER — ACETAMINOPHEN 500 MG
2 TABLET ORAL
Qty: 0 | Refills: 0 | DISCHARGE
Start: 2019-07-17

## 2019-07-17 RX ORDER — OXYCODONE HYDROCHLORIDE 5 MG/1
1 TABLET ORAL
Qty: 42 | Refills: 0
Start: 2019-07-17 | End: 2019-07-23

## 2019-07-17 RX ORDER — DOCUSATE SODIUM 100 MG
1 CAPSULE ORAL
Qty: 30 | Refills: 0
Start: 2019-07-17 | End: 2019-08-15

## 2019-07-17 RX ORDER — LISINOPRIL 2.5 MG/1
10 TABLET ORAL DAILY
Refills: 0 | Status: DISCONTINUED | OUTPATIENT
Start: 2019-07-17 | End: 2019-07-17

## 2019-07-17 RX ORDER — SENNA PLUS 8.6 MG/1
2 TABLET ORAL
Qty: 60 | Refills: 0
Start: 2019-07-17 | End: 2019-08-15

## 2019-07-17 RX ORDER — OXYCODONE HYDROCHLORIDE 5 MG/1
1 TABLET ORAL
Qty: 14 | Refills: 0
Start: 2019-07-17 | End: 2019-07-23

## 2019-07-17 RX ORDER — CIPROFLOXACIN LACTATE 400MG/40ML
1 VIAL (ML) INTRAVENOUS
Qty: 7 | Refills: 0
Start: 2019-07-17 | End: 2019-07-23

## 2019-07-17 RX ORDER — LISINOPRIL 2.5 MG/1
1 TABLET ORAL
Qty: 30 | Refills: 0
Start: 2019-07-17 | End: 2019-08-15

## 2019-07-17 RX ADMIN — OXYCODONE HYDROCHLORIDE 10 MILLIGRAM(S): 5 TABLET ORAL at 18:12

## 2019-07-17 RX ADMIN — Medication 650 MILLIGRAM(S): at 10:20

## 2019-07-17 RX ADMIN — OXYCODONE HYDROCHLORIDE 10 MILLIGRAM(S): 5 TABLET ORAL at 06:24

## 2019-07-17 RX ADMIN — ENOXAPARIN SODIUM 40 MILLIGRAM(S): 100 INJECTION SUBCUTANEOUS at 11:30

## 2019-07-17 RX ADMIN — Medication 100 MILLIGRAM(S): at 11:30

## 2019-07-17 RX ADMIN — Medication 25 MILLIGRAM(S): at 18:12

## 2019-07-17 RX ADMIN — OXYCODONE HYDROCHLORIDE 5 MILLIGRAM(S): 5 TABLET ORAL at 00:11

## 2019-07-17 RX ADMIN — Medication 25 MILLIGRAM(S): at 06:24

## 2019-07-17 RX ADMIN — OXYCODONE HYDROCHLORIDE 10 MILLIGRAM(S): 5 TABLET ORAL at 19:10

## 2019-07-17 RX ADMIN — OXYCODONE HYDROCHLORIDE 5 MILLIGRAM(S): 5 TABLET ORAL at 14:39

## 2019-07-17 RX ADMIN — AMLODIPINE BESYLATE 10 MILLIGRAM(S): 2.5 TABLET ORAL at 06:24

## 2019-07-17 RX ADMIN — Medication 37.5 MICROGRAM(S): at 06:24

## 2019-07-17 RX ADMIN — Medication 650 MILLIGRAM(S): at 09:21

## 2019-07-17 RX ADMIN — OXYCODONE HYDROCHLORIDE 10 MILLIGRAM(S): 5 TABLET ORAL at 06:53

## 2019-07-17 RX ADMIN — LISINOPRIL 5 MILLIGRAM(S): 2.5 TABLET ORAL at 06:24

## 2019-07-17 RX ADMIN — OXYCODONE HYDROCHLORIDE 5 MILLIGRAM(S): 5 TABLET ORAL at 15:40

## 2019-07-17 NOTE — DIETITIAN INITIAL EVALUATION ADULT. - ADD RECOMMEND
1. Add Glucerna Shakes TID (660 kcal, 30g protein, 600mL H2O) 2. Manage pain prn 3. Monitor and replete lytes prn 4. Reinforce ed prn

## 2019-07-17 NOTE — PROGRESS NOTE ADULT - PROBLEM SELECTOR PLAN 6
amlodipine 10mg po qd, metoprolol 25mg po qd, lisinopril 5mg qd f/u w/ oncology as outpt  consider palliative c/s

## 2019-07-17 NOTE — PROGRESS NOTE ADULT - PROBLEM SELECTOR PROBLEM 1
Fever, unspecified fever cause
Pain
Fever, unspecified fever cause
Sepsis, due to unspecified organism
Pain

## 2019-07-17 NOTE — PROGRESS NOTE ADULT - PROBLEM SELECTOR PLAN 4
Patient with known metastatic disease, follows with Dr. Grier who has prescribed chemotherapy. Patient is unsure what he takes, pharmacy is currently closed, will obtain collateral in AM.   - Will need d/w Dr. Grier to determine course of disease  - Patient indicates he would prefer not to be on life support, is DNR/DNI, however would like prognostication and discussion with family prior to any other change in treatment course. Recommend heme/onc consult.   - Palliative consult for cancer-related pain, responded to morphine in ED, however will require long-lasting regimen. Patient is also complaining of R leg pain and pain at the top of his skull. 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
f/u w/ oncology as outpt  consider palliative c/s
ossible mesenteric lymphadenitis. UA negative (7/12); blood cultx ngtd (7/12), GI pcr negative (7/13), cxr negative. Afebrile since admission  -will continue , levaquin 750mg PO qd. on d/c
down to 2.9 on 7/15, up to 3.9 today with repletion  -trend daily bmp
down to 2.9 on 7/15, up to 3.9 today with repletion  -trend daily bmp
f/u w/ oncology as outpt  consider palliative c/s
ossible mesenteric lymphadenitis. UA negative (7/12); blood cultx ngtd (7/12), GI pcr negative (7/13), cxr negative. Afebrile since admission  - continue , levaquin 750mg PO qd. Day 2/?

## 2019-07-17 NOTE — DIETITIAN INITIAL EVALUATION ADULT. - PROBLEM SELECTOR PLAN 6
- Continue home amlodipine 10mg po qd, metoprolol 25mg po qd    ADDENDUM: held amlodipine in setting of sepsis, restart prn

## 2019-07-17 NOTE — PROGRESS NOTE ADULT - SUBJECTIVE AND OBJECTIVE BOX
AMALIA CHAPMAN   MRN-[id number]    : 1935    HPI:  Patient is an 83 y/o male w hx of metastatic prostate cancer with (on chemotherapy), HTN, HLD, DM who presented to [facility] with lower abdominal pain x 2-3 days. He states that he has been having a severe cramping lower abdominal pain which was associated with constipation and one episode of vomiting yesterday AM, however he had 2 episodes of watery diarrhea after taking milk of magnesia at home. He denied any chills but on arrival to [facility] he was found to be febrile to 102.6. Of note he also complains of R leg pain and pain on the top of his head for the last 3 days, which he has never had before. In [facility] he had a CTAP which showed extensive metastatic disease as well as large necrotic lymph nodes in the RLQ and lower abdomen. He received 1 dose of CTX, morphine 4mgx2, NS 2200cc's, and was transferred to [facility]. (2019 00:24)    CTAP: reveals Extensive solid and necrotic lymphadenopathy throughout the abdomen and right pelvic sidewall Palliative Medicine consulted to assist with symptom management and GOC discussion       Subjective: pain better managed this AM, pt anxious for D/C states small Bm last night     ROS:                     Dyspnea (Elizabeth 0-10): 0                       N/V (Y/N): N                              Secretions (Y/N) :  N              Agitation(Y/N): N  Pain (Y/N):  pt with c/o intermittent spontaneous sharp stabbing pain to RUQ and along pubis region. Unable to accurately quantify states he has small to  medium pain. States pain exacerbates  after eating solids and when he has the urge to urinate  and is much  relieved following urination and use of  with opioids.     Allergies    No Known Allergies    Intolerances    Opiate Naive (Y/N): N received Morphine in [facility], now on Percocet in hospital   -iStop reviewed (Y/N): Yes I Ref: [id number]	    Medications:      MEDICATIONS  (STANDING):  amLODIPine   Tablet 10 milliGRAM(s) Oral daily  dextrose 5%. 1000 milliLiter(s) (50 mL/Hr) IV Continuous <Continuous>  dextrose 50% Injectable 12.5 Gram(s) IV Push once  docusate sodium 100 milliGRAM(s) Oral daily  enoxaparin Injectable 40 milliGRAM(s) SubCutaneous every 24 hours  insulin lispro (HumaLOG) corrective regimen sliding scale   SubCutaneous Before meals and at bedtime  levoFLOXacin  Tablet 750 milliGRAM(s) Oral every 24 hours  levothyroxine 37.5 MICROGram(s) Oral daily  lisinopril 10 milliGRAM(s) Oral daily  metoprolol tartrate 25 milliGRAM(s) Oral two times a day  oxyCODONE  ER Tablet 10 milliGRAM(s) Oral every 12 hours  senna 2 Tablet(s) Oral at bedtime  simvastatin 20 milliGRAM(s) Oral at bedtime  tamsulosin 0.8 milliGRAM(s) Oral at bedtime    MEDICATIONS  (PRN):  acetaminophen   Tablet .. 650 milliGRAM(s) Oral every 6 hours PRN Temp greater or equal to 38C (100.4F)  acetaminophen   Tablet .. 650 milliGRAM(s) Oral every 6 hours PRN Mild Pain (1 - 3)  bisacodyl Suppository 10 milliGRAM(s) Rectal once PRN Constipation  dextrose 40% Gel 15 Gram(s) Oral once PRN Blood Glucose LESS THAN 70 milliGRAM(s)/deciliter  glucagon  Injectable 1 milliGRAM(s) IntraMuscular once PRN Glucose LESS THAN 70 milligrams/deciliter  oxyCODONE    IR 5 milliGRAM(s) Oral every 4 hours PRN Moderate Pain (4 - 6)    Labs:                                             9.0    6.01  )-----------( 255      ( 2019 11:07 )             28.3   -    140  |  104  |  7   ----------------------------<  124<H>  4.0   |  21<L>  |  0.96    Ca    9.4      2019 11:07  Phos  2.7     -  Mg     2.1     -    Culture - Blood (19 @ 16:59)    Specimen Source: .Blood Blood-Peripheral    Culture Results:   No growth to date.    Urinalysis (19 @ 12:09)    Glucose Qualitative, Urine: 500    Blood, Urine: Trace    pH Urine: 7.0    Color: Yellow    Urine Appearance: Clear    Bilirubin: NEGATIVE    Ketone - Urine: Trace mg/dL    Specific Gravity: 1.010    Protein, Urine: 30 mg/dL    Urobilinogen: 0.2 E.U./dL    Nitrite: NEGATIVE    Leukocyte Esterase Concentration: NEGATIVE    Clostridium difficile Toxin by PCR (19 @ 03:02)    C Diff by PCR Result: No tDetec    Imaging: no new imaging todya     EXAM:  XR ABDO-ACUTE ABD SRS W CXR 1 VW                          PROCEDURE DATE:  07/15/2019      INTERPRETATION:  Clinical history/reason for exam:Metastatic prostate   cancer with severe abdominal pain    Comparison: CT abdomen and pelvis 2019.    Procedure: Abdominal series including single view of the chest and   upright and supine views of the abdomen    Findings:  There is no focal consolidation, pleural effusion or pneumothorax.    There is a nonspecific bowel gas pattern with a paucity of air. There is   no free air under the diaphragm. There are degenerative changes of the   lumbar spine and the bilateral hips. There is no abnormal calcification.    Impression:  Nonspecific bowel gas pattern with a paucity of air.      EXAM:  CT ABDOMEN AND PELVIS IC                           PROCEDURE DATE:  2019        INTERPRETATION:  CLINICAL INFORMATION: Fever and diffuse lower abdominal   pain. 84 male pt, hx of HTN, HLD, DM, prostate CA, presents w worsening   lower abd pain since yesterday. Nausea, vomiting as well. on arrival is   having diarrhea episodes. watery brown. low grade temp oral. rectal to be   done now. States pain started last night and has gradually gotten worse.    COMPARISON: None.    PIMPRESSION:   1.  Extensive solid and necrotic lymphadenopathy throughout the abdomen   and right pelvic sidewall with a prominent right lower quadrant partially   necrotic lymph node with extensive surrounding fat stranding, which is   likely the etiology of patient's right-sided mild hydroureteronephrosis.   Correlate for UTI.   2.  There are extensive osseous metastases. These findings most likely   represent recurrence of patient's prostate cancer.  3.   Extensive atheromatous disease of the aortoiliac system and   branches. 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    PEx:  Vital Signs Last 24 Hrs  T(C): 36.9 (2019 08:33), Max: 37.6 (2019 21:46)  T(F): 98.4 (2019 08:33), Max: 99.6 (2019 21:46)  HR: 72 (2019 08:33) (72 - 82)  BP: 151/60 (2019 08:33) (144/57 - 163/54)  BP(mean): --  RR: 18 (2019 08:33) (16 - 18)  SpO2: 95% (2019 08:33) (94% - 98%)    General: elderly AA male in bed In NAD  HEENT: NC/AT,  enucleated Left eye, poor dentition, MMM  Neck: supple, no JVD   CVS: S1S2 RRR no MRG  Resp: CTA B/L no RRW  GI: + ascites, hypoactive BS x 4, pt states formed BM this AM    :  voiding freely  Musc:  MARCUM 5/5 strength    Neuro: AxOx3, no focal deficits  Psych:  calm and cooperative   Skin: no rashes  Lymph: No LAD  Preadmit Karnofsky: 40 %           Current Karnofsky:  30-40 %  Cachexia (Y/N): N  BMI: 21.4    Advanced Directives   DNR/DNI   MOLST       Decision maker: pt has capacity to make complex medical decisions, needs more simplistic explanations   Legal surrogate: Vipin Mullen (brother): [phone]    Social History: lives with adult son, has another son with whom he is estranged Bahai    GOALS OF CARE DISCUSSION       Palliative care info/counseling provided	           Family meeting       Advanced Directives addressed please see Advance Care Planning Note	           Documentation of GOC: D/C home will see Dr Jacome from [facility] this week to discuss "next steps"  	          REFERRALS	        Palliative Med        Unit SW/Case Mgmt              Music Therapy      Nutrition      PT/OT

## 2019-07-17 NOTE — PROGRESS NOTE ADULT - PROBLEM SELECTOR PROBLEM 3
Diarrhea, unspecified type
Diarrhea, unspecified type
Lymphadenopathy, abdominal
Diarrhea, unspecified type
Lymphadenopathy, abdominal

## 2019-07-17 NOTE — PROGRESS NOTE ADULT - SUBJECTIVE AND OBJECTIVE BOX
OVERNIGHT EVENTS: NAEO    New results: CTA pending    SUBJECTIVE:    Vital Signs Last 12 Hrs  T(F): 98 (07-17-19 @ 05:50), Max: 99.6 (07-16-19 @ 21:46)  HR: 82 (07-17-19 @ 05:50) (81 - 82)  BP: 163/54 (07-17-19 @ 05:50) (144/57 - 163/54)  BP(mean): --  RR: 18 (07-17-19 @ 05:50) (16 - 18)  SpO2: 95% (07-17-19 @ 05:50) (94% - 95%)  I&O's Summary    PHYSICAL EXAM:  Constitutional: NAD, comfortable in bed.  HEENT: NC/AT, PERRLA, EOMI, no conjunctival pallor or scleral icterus, MMM  Neck: Supple, no JVD  Respiratory: Normal rate, rhythm, depth, effort. CTAB. No w/r/r.   Cardiovascular: RRR, normal S1 and S2, no m/r/g.   Gastrointestinal: +BS, soft NTND, no guarding or rebound tenderness, no palpable masses   Extremities: wwp; no cyanosis, clubbing or edema.   Vascular: Pulses equal and strong throughout.   Neurological: AAOx3, no CN deficits, strength and sensation intact throughout.   Skin: No gross skin abnormalities or rashes      LABS:                        8.5    5.46  )-----------( 236      ( 16 Jul 2019 08:16 )             26.1     07-16    142  |  104  |  6<L>  ----------------------------<  141<H>  3.8   |  21<L>  |  0.89    Ca    9.0      16 Jul 2019 08:16  Phos  2.4     07-16  Mg     1.7     07-16    RADIOLOGY & ADDITIONAL TESTS:    MEDICATIONS  (STANDING):  amLODIPine   Tablet 10 milliGRAM(s) Oral daily  dextrose 5%. 1000 milliLiter(s) (50 mL/Hr) IV Continuous <Continuous>  dextrose 50% Injectable 12.5 Gram(s) IV Push once  docusate sodium 100 milliGRAM(s) Oral daily  enoxaparin Injectable 40 milliGRAM(s) SubCutaneous every 24 hours  insulin lispro (HumaLOG) corrective regimen sliding scale   SubCutaneous Before meals and at bedtime  levoFLOXacin  Tablet 750 milliGRAM(s) Oral every 24 hours  levothyroxine 37.5 MICROGram(s) Oral daily  lisinopril 5 milliGRAM(s) Oral daily  metoprolol tartrate 25 milliGRAM(s) Oral two times a day  oxyCODONE  ER Tablet 10 milliGRAM(s) Oral every 12 hours  senna 2 Tablet(s) Oral at bedtime  simvastatin 20 milliGRAM(s) Oral at bedtime  tamsulosin 0.8 milliGRAM(s) Oral at bedtime    MEDICATIONS  (PRN):  acetaminophen   Tablet .. 650 milliGRAM(s) Oral every 6 hours PRN Temp greater or equal to 38C (100.4F)  acetaminophen   Tablet .. 650 milliGRAM(s) Oral every 6 hours PRN Mild Pain (1 - 3)  bisacodyl Suppository 10 milliGRAM(s) Rectal once PRN Constipation  dextrose 40% Gel 15 Gram(s) Oral once PRN Blood Glucose LESS THAN 70 milliGRAM(s)/deciliter  glucagon  Injectable 1 milliGRAM(s) IntraMuscular once PRN Glucose LESS THAN 70 milligrams/deciliter  oxyCODONE    IR 5 milliGRAM(s) Oral every 4 hours PRN Moderate Pain (4 - 6) OVERNIGHT EVENTS: NAEO    SUBJECTIVE: Patient feels well. Had one BM yesterday and one this morning, per pt they were normal. Per nurse, yesterdays was liquid brown. Patient with no urinary or bowel complaints, pain well controlled on pain regimen.     ROS: denies f/c/n/v, chest pain, sob    Vital Signs Last 12 Hrs  T(F): 98 (07-17-19 @ 05:50), Max: 99.6 (07-16-19 @ 21:46)  HR: 82 (07-17-19 @ 05:50) (81 - 82)  BP: 163/54 (07-17-19 @ 05:50) (144/57 - 163/54)  BP(mean): --  RR: 18 (07-17-19 @ 05:50) (16 - 18)  SpO2: 95% (07-17-19 @ 05:50) (94% - 95%)  I&O's Summary    PHYSICAL EXAM:  Constitutional: NAD, comfortable in bed.  HEENT: EOMI, MMM  Respiratory: Normal rate, rhythm, depth, effort. CTAB. No w/r/r.   Cardiovascular: RRR, normal S1 and S2, no m/r/g.   Gastrointestinal: +BS, mildly distended, soft, tender to deep palpation in LUQ, no guarding or rebound tenderness, no palpable masses   Extremities: wwp; no cyanosis, clubbing or edema.   Vascular: Pulses equal and strong throughout.     LABS:                          9.0    6.01  )-----------( 255      ( 17 Jul 2019 11:07 )             28.3     07-16    142  |  104  |  6<L>  ----------------------------<  141<H>  3.8   |  21<L>  |  0.89    Ca    9.0      16 Jul 2019 08:16  Phos  2.4     07-16  Mg     1.7     07-16    RADIOLOGY & ADDITIONAL TESTS:    MEDICATIONS  (STANDING):  amLODIPine   Tablet 10 milliGRAM(s) Oral daily  dextrose 5%. 1000 milliLiter(s) (50 mL/Hr) IV Continuous <Continuous>  dextrose 50% Injectable 12.5 Gram(s) IV Push once  docusate sodium 100 milliGRAM(s) Oral daily  enoxaparin Injectable 40 milliGRAM(s) SubCutaneous every 24 hours  insulin lispro (HumaLOG) corrective regimen sliding scale   SubCutaneous Before meals and at bedtime  levoFLOXacin  Tablet 750 milliGRAM(s) Oral every 24 hours  levothyroxine 37.5 MICROGram(s) Oral daily  lisinopril 5 milliGRAM(s) Oral daily  metoprolol tartrate 25 milliGRAM(s) Oral two times a day  oxyCODONE  ER Tablet 10 milliGRAM(s) Oral every 12 hours  senna 2 Tablet(s) Oral at bedtime  simvastatin 20 milliGRAM(s) Oral at bedtime  tamsulosin 0.8 milliGRAM(s) Oral at bedtime    MEDICATIONS  (PRN):  acetaminophen   Tablet .. 650 milliGRAM(s) Oral every 6 hours PRN Temp greater or equal to 38C (100.4F)  acetaminophen   Tablet .. 650 milliGRAM(s) Oral every 6 hours PRN Mild Pain (1 - 3)  bisacodyl Suppository 10 milliGRAM(s) Rectal once PRN Constipation  dextrose 40% Gel 15 Gram(s) Oral once PRN Blood Glucose LESS THAN 70 milliGRAM(s)/deciliter  glucagon  Injectable 1 milliGRAM(s) IntraMuscular once PRN Glucose LESS THAN 70 milligrams/deciliter  oxyCODONE    IR 5 milliGRAM(s) Oral every 4 hours PRN Moderate Pain (4 - 6)

## 2019-07-17 NOTE — PROGRESS NOTE ADULT - PROBLEM SELECTOR PLAN 2
- treat as above  #pain control  - pain well controlled, switched to 5mg oxy q4h - treat as above  #abdominal pain - low suspicion of acute/surgical abdomen at this time, has bowel sounds, less tender than 2 days ago, patient having bms, soft  - pain well controlled, switched to 5mg oxy q4h  - f/u surgery recs, f/u lactacte; cta not needed at this time

## 2019-07-17 NOTE — PROGRESS NOTE ADULT - PROBLEM SELECTOR PROBLEM 8
Urinary retention Type 2 diabetes mellitus without complication, without long-term current use of insulin

## 2019-07-17 NOTE — DIETITIAN INITIAL EVALUATION ADULT. - PROBLEM SELECTOR PLAN 1
#Sepsis 2/2 lymph node infection  - Patient found to have fever and tachycardia on arrival w/ lower abdominal pain, CTAP revealing large necrotic lower abdominal lymph nodes with fat stranding, signs of infection/inflammation. Likely 2/2 metastatic CA vs radiation.   - S/p 1 dose CTX. Fluid resuscitated with 2200cc NS. Starting zosyn on arrival for intraabdominal infection, dosed based on CrCl of 46.   - F/u blood cultures

## 2019-07-17 NOTE — DIETITIAN INITIAL EVALUATION ADULT. - PROBLEM SELECTOR PLAN 3
#Watery stool/constipation  - Patient with watery stool after taking milk of magnesia for constipation, constipation likely caused by lymph node inflammation, no stool burden seen on CT. Low suspicion for diarrheal infection, does not have c-diff risk factors, GI studies sent from [facility], will f/u.

## 2019-07-17 NOTE — PROGRESS NOTE ADULT - ASSESSMENT
Patient is an 85 y/o male w hx of metastatic prostate cancer (on chemotherapy), HTN, HLD, DM who presented to [facility] with lower abdominal pain x 2-3 days, febrile in ED and found to have necrotic intraabdominal lymph nodes w fat stranding. Pain responding to medical managment and after BMs, on zosyn currently, has been afebrile since admission. Patient is an 83 y/o male w hx of metastatic prostate cancer (on chemotherapy), HTN, HLD, DM who presented to [facility] with lower abdominal pain x 2-3 days, febrile in ED and found to have necrotic intraabdominal lymph nodes w fat stranding. Pain responding to medical managment and after BMs, on levaquin currently, has been afebrile since admission.

## 2019-07-17 NOTE — PROGRESS NOTE ADULT - ASSESSMENT
Assessment: 84M PMH metastatic prostate cancer on chemotherapy, DM, HTN, HLD a/w abdominal pain likely 2/2 lymphadenitis. General surgery consulted to evaluate surgical causes of pain. Patient's abdominal pain has improved and does not show any signs/symptoms of an acute abdomen.    Recommendations: Assessment: 84M PMH metastatic prostate cancer on chemotherapy, DM, HTN, HLD a/w abdominal pain likely 2/2 lymphadenitis. General surgery consulted to evaluate surgical causes of pain. Patient's abdominal pain has improved and does not show any signs/symptoms of an acute abdomen.    Recommendations:  - Order lactate  - If lactate is normal, no need for CTA  - Continue care per primary team  - No general surgery intervention at this time  - General Surgery Team 4C will continue to follow  - Case discussed with Dr. Barcenas

## 2019-07-17 NOTE — PROGRESS NOTE ADULT - PROBLEM SELECTOR PLAN 1
states pain well managed on present regimen  used 3 doses in past 24 hrs  starting Oxycontin 10 mg po q12 this AM  continue Oxycodone 5 mg po q 4 hr pen moderate pain  may consider PPI

## 2019-07-17 NOTE — PROGRESS NOTE ADULT - PROBLEM SELECTOR PLAN 3
likely 2/2 increasing disease burden.
resolved
likely 2/2 increasing disease burden.

## 2019-07-17 NOTE — PROGRESS NOTE ADULT - SUBJECTIVE AND OBJECTIVE BOX
ID: 84M PMH metastatic prostate cancer on chemotherapy, DM, HTN, HLD a/w abdominal pain likely 2/2 lymphadenitis. General surgery consulted to evaluate surgical causes of pain.      SUBJECTIVE: Abdominal pain across lower abdomen is much improved since yesterday. Denies nausea/vomiting      OBJECTIVE:    Vital Signs:  Vital Signs Last 24 Hrs  T(C): 36.7 (17 Jul 2019 05:50), Max: 37.6 (16 Jul 2019 21:46)  T(F): 98 (17 Jul 2019 05:50), Max: 99.6 (16 Jul 2019 21:46)  HR: 82 (17 Jul 2019 05:50) (79 - 82)  BP: 163/54 (17 Jul 2019 05:50) (132/56 - 163/54)  BP(mean): --  RR: 18 (17 Jul 2019 05:50) (16 - 18)  SpO2: 95% (17 Jul 2019 05:50) (94% - 98%)    Physical Exam:  General: NAD  Pulmonary: Nonlabored breathing, no respiratory distress  Abdominal: Soft, tender to deep palpation in lower quadrants, no rebound tenderness, slightly distended    Lines/Drains/Tubes:    Ins and Outs:  I&O's Summary      Labs:                        8.5    5.46  )-----------( 236      ( 16 Jul 2019 08:16 )             26.1     07-16    142  |  104  |  6<L>  ----------------------------<  141<H>  3.8   |  21<L>  |  0.89    Ca    9.0      16 Jul 2019 08:16  Phos  2.4     07-16  Mg     1.7     07-16          CAPILLARY BLOOD GLUCOSE      POCT Blood Glucose.: 106 mg/dL (16 Jul 2019 22:29)  POCT Blood Glucose.: 109 mg/dL (16 Jul 2019 17:38)  POCT Blood Glucose.: 142 mg/dL (16 Jul 2019 12:01)  POCT Blood Glucose.: 147 mg/dL (16 Jul 2019 08:28)        Radiology & Additional Studies:

## 2019-07-17 NOTE — PROGRESS NOTE ADULT - PROBLEM SELECTOR PLAN 5
Removed right radial arterial line. Held pressure and placed surgicel on site. Pressure bandage applied with gauze and tegaderm. f/u w/ oncology as outpt  consider palliative c/s stable ~9.0; mcv normocytic, likely due to chronic disease

## 2019-07-17 NOTE — PROGRESS NOTE ADULT - PROBLEM SELECTOR PLAN 10
1) PCP Contacted on Admission: (Y/N) --> Name & Phone #:  2) Date of Contact with PCP:  3) PCP Contacted at Discharge: (Y/N, N/A)  4) Summary of Handoff Given to PCP:   5) Post-Discharge Appointment Date and Location: Dr. Grier ([phone], [facility])

## 2019-07-17 NOTE — PROGRESS NOTE ADULT - PROBLEM SELECTOR PLAN 1
possible mesenteric lymphadenitis. UA negative (7/12); blood cultx ngtd (7/12), GI pcr negative (7/13), cxr negative. Afebrile since admission  - transitioned to oral levaquin 750mg PO qd  - f/u cta possible mesenteric lymphadenitis. UA negative (7/12); blood cultx ngtd (7/12), GI pcr negative (7/13), cxr negative. Afebrile since admission  - transitioned to oral levaquin 750mg PO qd; first dose was on 7/15, needs 10 days  - f/u cta

## 2019-07-17 NOTE — DISCHARGE NOTE NURSING/CASE MANAGEMENT/SOCIAL WORK - NSDCDPATPORTLINK_GEN_ALL_CORE
You can access the [facility] Patient Portal, offered by [facility], by registering with the following website: [email or web address]

## 2019-07-17 NOTE — DIETITIAN INITIAL EVALUATION ADULT. - OTHER INFO
84M w hx of metastatic prostate cancer (on chemotherapy), HTN, HLD, DM (A1C 9.5) who presented to [facility] with lower abdominal pain x 2-3 days. He states that he has been having a severe cramping lower abdominal pain which was associated with constipation and one episode of vomiting.  CT scan showing multiple mets including necrotic lymph nodes w adenitis, admitted for management of lymphadenitis. Per sx no intervention to take place at this time. Pt seen resting in bed reporting persistent abdominal pain, only able to eat small amounts but appetite has been improving. Follows regular diet at home, searching for diet suitable for "stomach issues", wishes writer to return after CT scan when he is placed on diet so he is aware of what to eat at home. Discussed CCD with pt and importance of adherence/ eating through day to meet needs. Denies n/v/d/c, chewing/ swallowing issues or pain impacting intake, skin is intact. NKFA, denies wt loss/ unsure of UBW. Will continue to follow per protocol and align nutrition with GOC at all times.

## 2019-07-17 NOTE — PROGRESS NOTE ADULT - PROBLEM SELECTOR PLAN 2
Pt reports medium sized brown formed BM last night   continue Senna/Colace, dulcolax supp prn  must be diligent to assess for s/s OIC.

## 2019-07-17 NOTE — PROGRESS NOTE ADULT - PROBLEM SELECTOR PLAN 5
treated by Dr Jacome at [facility].  Pt with poor knowledge of his previous tx. Med team to reach out to obtain collateral.

## 2019-07-17 NOTE — DIETITIAN INITIAL EVALUATION ADULT. - ENERGY NEEDS
HOSPITALIST PROGRESS NOTE:    Patient: Ella Shine Date: 2019   : 1937 Attending: Keith Sun DO   81 year old female      Chief Complaint: weakness    Subjective: Patient feeling well, no new complaints.    Problem List:   Patient Active Problem List   Diagnosis   • GERD (gastroesophageal reflux disease)   • Coronary atherosclerosis of native coronary artery   • Arrhythmia   • Hyperlipidemia   • Osteoporosis   • HTN (hypertension)   • Osteoarthrosis, unspecified whether generalized or localized, lower leg   • Malignant neoplasm of breast, right, subareolar; s/p SLN bx. and Lumpectomy, 12   • S/P coronary artery stent placement   • Abnormality of gait   • History of cardiac catheterization   • Personal history of malignant neoplasm of breast   • Hypertension   • Venous stasis ulcers of both lower extremities (CMS/HCC)   • Arthritis of left knee       Allergies: ALLERGIES:  No Known Allergies    Medications/Infusions:  Scheduled:   • lidocaine  1 patch Transdermal Daily   • docusate sodium-sennosides  1 tablet Oral Nightly   • apixaban  2.5 mg Oral 2 times per day   • aspirin  81 mg Oral Daily   • simvastatin  40 mg Oral Nightly   • isosorbide mononitrate  60 mg Oral Daily   • losartan  100 mg Oral Daily   • amLODIPine  10 mg Oral Daily   • sotalol  40 mg Oral 2 times per day   • sodium chloride (PF)  2 mL Injection 2 times per day     Review of Systems: 12 point ROS done and negative otherwise    Objective/Physical Exam     Vital 24 Hour Range Last Value   Temperature Temp  Min: 98.1 °F (36.7 °C)  Max: 98.5 °F (36.9 °C) 98.3 °F (36.8 °C) (19)   Pulse Pulse  Min: 54  Max: 58 54 (19)   Respiratory Resp  Min: 16  Max: 20 16 (19)   Non-Invasive  Blood Pressure BP  Min: 128/60  Max: 173/76 173/76 (19)   Pulse Oximetry SpO2  Min: 94 %  Max: 95 % 94 % (19)     Intake/Output:     Intake/Output Summary (Last 24 hours) at 2019 0955  Last data  filed at 4/22/2019 0115  Gross per 24 hour   Intake --   Output 500 ml   Net -500 ml       Physical Exam:   GEN: NAD. Alert.  SKIN: No rashes or lesions   HEENT: NC, AT.   NECK: Full ROM. No thyromegaly or palpable masses. Trachea midline  CHEST: Respirations are non-labored. Lungs CTA anteriorly and posteriorly. No rales, wheezing or crackles  CV: RRR. S1, S2 normal. No S3, S4. No murmurs, rubs, gallops  ABDOMEN: Soft, non-tender. No rebound or guarding. No masses or hepatosplenomegaly  EXT: No cyanosis or edema. No joint effusion  NEURO: CNs II-XII grossly intact. Normal bulk for age. Increased tone sarkis in LUE and b/l shoulders. Decreased ROM at both shoulders and elbows, with left worse than right. 4+/5 b/l (effort dependent). B/l lowers have increased tone 1/5.  PSYCH: Affect appropriate. Mood stable.    Laboratory Results:  Recent Labs   Lab 04/19/19  1330 04/19/19  0354 04/18/19  0334 04/17/19  1715   WBC  --  6.8 8.6 6.5   HCT  --  35.3* 34.7* 40.8   HGB  --  10.9* 10.9* 12.9   PLT  --  159 176 186   INR  --   --   --  1.0   PTT  --   --   --  30   SODIUM  --  143 142 144   POTASSIUM 4.1 3.6 3.8 4.3   CHLORIDE  --  110* 112* 107   CO2  --  26 22 26   CALCIUM  --  9.1 9.1 9.7   GLUCOSE  --  82 100* 104*   BUN  --  18 29* 32*   CREATININE  --  0.82 0.93 0.98*   AST  --   --  30 14   GPT  --   --  14 20   ALKPT  --   --  76 92   BILIRUBIN  --   --  0.5 0.3   ALBUMIN  --   --  3.2* 3.7   GFRNA  --  68 58 55   PHOS  --   --  3.4  --      Imaging: No results found.    Assessment and Plan:  Ischemic infarction  Acute M2 occlusion leading to above   -Etiology cardioembolic   -Continue baby aspirin and Eliquis   -High intensity statin therapy   -Ongoing therapies and neurology recommendations appreciated   Paroxysmal atrial fibrillation  Coronary artery disease status post stenting  Essential hypertension   -Continue sotalol, losartan, Imdur, and Norvasc at current doses   -ASA, statin, too  Chronic kidney disease  stage III   -Avoid nephrotoxic agents and renally dose medications  Osteoarthritis - voltaren gel, heat, acetaminophen prn  Debility  FEN: no IVFs, replete lytes per protocol, .  Therapies: PT/OT/ST  DVT/GI prophylaxis: chemical-Eliquis, mechanical-SCDs; GI-none  Code status: Full Resuscitation     Case discussed with:  Patient and RN    Total time spent in the care of the patient was 25 minutes, greater than 50% of which was spent in counseling and coordination of care.    Hospital Day #: Hospital Day: 6    Tentative discharge disposition: Subacute once placement found    Signed:  DO NITESH Rodriguez Hospitalist  Pager [phone]    Please contact the attending hospitalist 7:00am through 7:00 pm and the night hospitalist on call 7:00pm through 7:00 am at [phone] for cross cover issues.     ABW used for calculations as pt between % of IBW.   ABW 75.7kg, IBW 86kg, 87% IBW, ht 74", BMI 21.4   Nutrient needs based on [facility] standards of care for repletion in older adults, adjusted for mets prostate cancer

## 2019-07-17 NOTE — PROGRESS NOTE ADULT - PROBLEM SELECTOR PROBLEM 2
Constipation
Lymphadenopathy, abdominal
Mesenteric lymphadenitis
Constipation

## 2019-07-17 NOTE — PROGRESS NOTE ADULT - PROBLEM SELECTOR PLAN 9
1) PCP Contacted on Admission: (Y/N) --> Name & Phone #:  2) Date of Contact with PCP:  3) PCP Contacted at Discharge: (Y/N, N/A)  4) Summary of Handoff Given to PCP:   5) Post-Discharge Appointment Date and Location: Dr. Grier ([phone], [facility]) - pvr ~125ml (7/14) and ~150 on 7/15  - flomax 0.8mg at bedtime (up from .4mg)  - monitor for urinary retention

## 2019-07-17 NOTE — PROGRESS NOTE ADULT - PROBLEM SELECTOR PLAN 7
- On metformin 500mg at home, non insulin dependent  - Moderate insulin sliding scale, consistent carb diet amlodipine 10mg po qd, metoprolol 25mg po qd, lisinopril 5mg qd

## 2019-07-17 NOTE — PROGRESS NOTE ADULT - PROVIDER SPECIALTY LIST ADULT
Internal Medicine
Surgery
Palliative Care
Hospitalist
Palliative Care

## 2019-07-17 NOTE — DIETITIAN INITIAL EVALUATION ADULT. - PROBLEM SELECTOR PLAN 9
1.       PCP Contacted on Admission: (Y/N) --> Name & Phone #: No, Dr. Nina Blachman  2.       Date of Contact with PCP:  3.       PCP Contacted at Discharge: (Y/N)  4.       Summary of Handoff Given to PCP:  5.       Post-Discharge Appointment Date and Location: [facility]

## 2019-07-17 NOTE — DIETITIAN INITIAL EVALUATION ADULT. - PROBLEM SELECTOR PLAN 4
Patient with known metastatic disease, follows with Dr. Grier who has prescribed chemotherapy. Patient is unsure what he takes, pharmacy is currently closed, will obtain collateral in AM.   - Will need d/w Dr. Grier to determine course of disease  - Patient indicates he would prefer not to be on life support, is DNR/DNI, however would like prognostication and discussion with family prior to any other change in treatment course. Recommend heme/onc consult.   - Palliative consult for cancer-related pain, responded to morphine in ED, however will require long-lasting regimen. Patient is also complaining of R leg pain and pain at the top of his skull. 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

## 2019-07-17 NOTE — PROGRESS NOTE ADULT - PROBLEM SELECTOR PLAN 8
- pvr ~125ml (7/14) and ~150 on 7/15  - flomax 0.8mg at bedtime (up from .4mg)  - monitor for urinary retention - On metformin 500mg at home, non insulin dependent  - Moderate insulin sliding scale, consistent carb diet

## 2019-07-17 NOTE — PROGRESS NOTE ADULT - ASSESSMENT
83 y/o male w hx of metastatic prostate cancer to (on chemotherapy), HTN, HLD, DM who presented to [facility] with lower abdominal pain x 2-3 days. He states that he has been having a severe cramping lower abdominal pain which was associated with constipation and one episode of vomiting

## 2019-07-22 DIAGNOSIS — I88.9 NONSPECIFIC LYMPHADENITIS, UNSPECIFIED: ICD-10-CM

## 2019-07-22 DIAGNOSIS — C79.51 SECONDARY MALIGNANT NEOPLASM OF BONE: ICD-10-CM

## 2019-07-22 DIAGNOSIS — E78.5 HYPERLIPIDEMIA, UNSPECIFIED: ICD-10-CM

## 2019-07-22 DIAGNOSIS — Z87.891 PERSONAL HISTORY OF NICOTINE DEPENDENCE: ICD-10-CM

## 2019-07-22 DIAGNOSIS — E11.9 TYPE 2 DIABETES MELLITUS WITHOUT COMPLICATIONS: ICD-10-CM

## 2019-07-22 DIAGNOSIS — A41.9 SEPSIS, UNSPECIFIED ORGANISM: ICD-10-CM

## 2019-07-22 DIAGNOSIS — C61 MALIGNANT NEOPLASM OF PROSTATE: ICD-10-CM

## 2019-07-22 DIAGNOSIS — R33.9 RETENTION OF URINE, UNSPECIFIED: ICD-10-CM

## 2019-07-22 DIAGNOSIS — Z51.5 ENCOUNTER FOR PALLIATIVE CARE: ICD-10-CM

## 2019-07-22 DIAGNOSIS — K59.00 CONSTIPATION, UNSPECIFIED: ICD-10-CM

## 2019-07-22 DIAGNOSIS — R50.9 FEVER, UNSPECIFIED: ICD-10-CM

## 2019-07-22 DIAGNOSIS — R09.02 HYPOXEMIA: ICD-10-CM

## 2019-07-22 DIAGNOSIS — E87.6 HYPOKALEMIA: ICD-10-CM

## 2019-07-22 DIAGNOSIS — Z97.0 PRESENCE OF ARTIFICIAL EYE: ICD-10-CM

## 2019-07-22 DIAGNOSIS — N17.9 ACUTE KIDNEY FAILURE, UNSPECIFIED: ICD-10-CM

## 2019-11-10 ENCOUNTER — EMERGENCY (EMERGENCY)
Facility: HOSPITAL | Age: 84
LOS: 1 days | Discharge: ANOTHER HOSPITAL | End: 2019-11-10
Attending: EMERGENCY MEDICINE | Admitting: EMERGENCY MEDICINE
Payer: MEDICARE

## 2019-11-10 VITALS
HEART RATE: 90 BPM | TEMPERATURE: 99 F | OXYGEN SATURATION: 94 % | RESPIRATION RATE: 18 BRPM | DIASTOLIC BLOOD PRESSURE: 60 MMHG | SYSTOLIC BLOOD PRESSURE: 143 MMHG

## 2019-11-10 VITALS
HEART RATE: 73 BPM | TEMPERATURE: 98 F | RESPIRATION RATE: 18 BRPM | DIASTOLIC BLOOD PRESSURE: 47 MMHG | SYSTOLIC BLOOD PRESSURE: 100 MMHG | HEIGHT: 69 IN | WEIGHT: 134.92 LBS | OXYGEN SATURATION: 95 %

## 2019-11-10 DIAGNOSIS — Z98.890 OTHER SPECIFIED POSTPROCEDURAL STATES: Chronic | ICD-10-CM

## 2019-11-10 LAB
ALBUMIN SERPL ELPH-MCNC: 2.3 G/DL — LOW (ref 3.4–5)
ALP SERPL-CCNC: 609 U/L — HIGH (ref 40–120)
ALT FLD-CCNC: 13 U/L — SIGNIFICANT CHANGE UP (ref 12–42)
AMMONIA BLD-MCNC: 25 UMOL/L — SIGNIFICANT CHANGE UP (ref 11–32)
ANION GAP SERPL CALC-SCNC: 16 MMOL/L — SIGNIFICANT CHANGE UP (ref 9–16)
APAP SERPL-MCNC: <2 UG/ML — LOW (ref 10–30)
AST SERPL-CCNC: 120 U/L — HIGH (ref 15–37)
BASOPHILS # BLD AUTO: 0.02 K/UL — SIGNIFICANT CHANGE UP (ref 0–0.2)
BASOPHILS NFR BLD AUTO: 0.2 % — SIGNIFICANT CHANGE UP (ref 0–2)
BILIRUB SERPL-MCNC: 0.7 MG/DL — SIGNIFICANT CHANGE UP (ref 0.2–1.2)
BUN SERPL-MCNC: 63 MG/DL — HIGH (ref 7–23)
CALCIUM SERPL-MCNC: 9.4 MG/DL — SIGNIFICANT CHANGE UP (ref 8.5–10.5)
CHLORIDE SERPL-SCNC: 106 MMOL/L — SIGNIFICANT CHANGE UP (ref 96–108)
CK MB BLD-MCNC: 0.78 % — SIGNIFICANT CHANGE UP
CK MB CFR SERPL CALC: 11.3 NG/ML — HIGH (ref 0.5–3.6)
CK SERPL-CCNC: 1449 U/L — HIGH (ref 39–308)
CO2 SERPL-SCNC: 24 MMOL/L — SIGNIFICANT CHANGE UP (ref 22–31)
CREAT SERPL-MCNC: 2.49 MG/DL — HIGH (ref 0.5–1.3)
EOSINOPHIL # BLD AUTO: 0.01 K/UL — SIGNIFICANT CHANGE UP (ref 0–0.5)
EOSINOPHIL NFR BLD AUTO: 0.1 % — SIGNIFICANT CHANGE UP (ref 0–6)
GLUCOSE SERPL-MCNC: 131 MG/DL — HIGH (ref 70–99)
HCT VFR BLD CALC: 28.8 % — LOW (ref 39–50)
HGB BLD-MCNC: 9.1 G/DL — LOW (ref 13–17)
IMM GRANULOCYTES NFR BLD AUTO: 0.6 % — SIGNIFICANT CHANGE UP (ref 0–1.5)
LACTATE SERPL-SCNC: 1.2 MMOL/L — SIGNIFICANT CHANGE UP (ref 0.4–2)
LIDOCAIN IGE QN: 71 U/L — LOW (ref 73–393)
LYMPHOCYTES # BLD AUTO: 0.79 K/UL — LOW (ref 1–3.3)
LYMPHOCYTES # BLD AUTO: 7.3 % — LOW (ref 13–44)
MCHC RBC-ENTMCNC: 28.9 PG — SIGNIFICANT CHANGE UP (ref 27–34)
MCHC RBC-ENTMCNC: 31.6 GM/DL — LOW (ref 32–36)
MCV RBC AUTO: 91.4 FL — SIGNIFICANT CHANGE UP (ref 80–100)
MONOCYTES # BLD AUTO: 0.87 K/UL — SIGNIFICANT CHANGE UP (ref 0–0.9)
MONOCYTES NFR BLD AUTO: 8.1 % — SIGNIFICANT CHANGE UP (ref 2–14)
NEUTROPHILS # BLD AUTO: 9.02 K/UL — HIGH (ref 1.8–7.4)
NEUTROPHILS NFR BLD AUTO: 83.7 % — HIGH (ref 43–77)
NRBC # BLD: 0 /100 WBCS — SIGNIFICANT CHANGE UP (ref 0–0)
NT-PROBNP SERPL-SCNC: HIGH PG/ML
PCO2 BLDV: 54 MMHG — HIGH (ref 41–51)
PH BLDV: 7.28 — LOW (ref 7.32–7.43)
PLATELET # BLD AUTO: 265 K/UL — SIGNIFICANT CHANGE UP (ref 150–400)
PO2 BLDV: 34 MMHG — LOW (ref 35–40)
POTASSIUM SERPL-MCNC: 5.5 MMOL/L — HIGH (ref 3.5–5.3)
POTASSIUM SERPL-SCNC: 5.5 MMOL/L — HIGH (ref 3.5–5.3)
PROT SERPL-MCNC: 7.8 G/DL — SIGNIFICANT CHANGE UP (ref 6.4–8.2)
RBC # BLD: 3.15 M/UL — LOW (ref 4.2–5.8)
RBC # FLD: 14.5 % — SIGNIFICANT CHANGE UP (ref 10.3–14.5)
SALICYLATES SERPL-MCNC: 3.4 MG/DL — SIGNIFICANT CHANGE UP (ref 2.8–20)
SAO2 % BLDV: 52 % — SIGNIFICANT CHANGE UP
SODIUM SERPL-SCNC: 146 MMOL/L — HIGH (ref 132–145)
TROPONIN I SERPL-MCNC: 0.08 NG/ML — HIGH (ref 0.02–0.06)
TSH SERPL-MCNC: 2.71 UIU/ML — SIGNIFICANT CHANGE UP (ref 0.36–3.74)
WBC # BLD: 10.78 K/UL — HIGH (ref 3.8–10.5)
WBC # FLD AUTO: 10.78 K/UL — HIGH (ref 3.8–10.5)

## 2019-11-10 PROCEDURE — 71045 X-RAY EXAM CHEST 1 VIEW: CPT | Mod: 26

## 2019-11-10 PROCEDURE — 99291 CRITICAL CARE FIRST HOUR: CPT

## 2019-11-10 PROCEDURE — 99292 CRITICAL CARE ADDL 30 MIN: CPT

## 2019-11-10 RX ORDER — KETAMINE HYDROCHLORIDE 100 MG/ML
18 INJECTION INTRAMUSCULAR; INTRAVENOUS ONCE
Refills: 0 | Status: DISCONTINUED | OUTPATIENT
Start: 2019-11-10 | End: 2019-11-10

## 2019-11-10 RX ADMIN — KETAMINE HYDROCHLORIDE 18 MILLIGRAM(S): 100 INJECTION INTRAMUSCULAR; INTRAVENOUS at 20:48

## 2019-11-10 RX ADMIN — KETAMINE HYDROCHLORIDE 400 MILLIGRAM(S): 100 INJECTION INTRAMUSCULAR; INTRAVENOUS at 20:24

## 2019-11-10 NOTE — ED PROVIDER NOTE - OBJECTIVE STATEMENT
83 yo M DNR/DNI with profound ca/mets, on visiting nurse hospice care, recent discharge from [facility] yesterday for SOB p/w continued and unchanged SOB. Patient denies any changes in symptoms and c/o mild substernal chest pain that has been unchanged with his SOB. Denies any fever, LOC, syncope, trauma, headache, dizziness    Spoke with [facility]: patient is confirmed DNR/DNI with visiting hospice services. No workup done due to DNR/DNI status. But ECG was negative for STEMI. Given medication for comfort care only. Last CTA was in August. 83 yo M metastatis prostate cancer, htn, hld, dm, on visiting nurse hospice care, recent discharge from [facility] yesterday for SOB p/w continued and unchanged SOB. Patient denies any changes in symptoms and c/o mild substernal chest pain that has been unchanged with his SOB. Denies any fever, LOC, syncope, trauma, headache, dizziness    Spoke with [facility]: patient is confirmed DNR/DNI with visiting hospice services. No workup done due to DNR/DNI status. But ECG was negative for STEMI. Given medication for comfort care only. Last CTA was in August.

## 2019-11-10 NOTE — ED PROVIDER NOTE - PROGRESS NOTE DETAILS
Patient refusing narcotics or opioids for pain control, especially morphine, as it affects his breathing. [facility] number [phone] provided by son for continuity of care. [facility] number [phone] provided by shereen for continuity of care. Recommends that patient be admitted/transferred to [facility] for Hospice inpatient admission. Patient refusing CTA for PE as "I just want to be made comfortable." Patient understands the risks of refusing CTA for PE including worsening SOB and death, but patient is lucid in describing how he is at his end of his life and the treatment for PE would bear no utility for him. [facility] number [phone] provided by son for continuity of care. Dr. Gauthier on-call for patient tecommends that patient be admitted/transferred to [facility] for Hospice inpatient admission. Ambulance sent.

## 2019-11-10 NOTE — ED ADULT NURSE NOTE - PMH
Essential hypertension    History of eye prosthesis    HLD (hyperlipidemia)    Prostate CA    T2DM (type 2 diabetes mellitus)

## 2019-11-10 NOTE — ED ADULT NURSE NOTE - OBJECTIVE STATEMENT
85 yo M c.o SOB. Pt son at bedside states "he is on hospice for prostate cancer, the hospice nurse came yesterday and gave him morphine around 1140 pm last night and he began having sob today. He also is complaining about pain in both of his legs". Pt c.o pain of 7/10 in both legs at this time. Telemetry and cont pulse ox monitoring initiated. Pt placed on non rebreather at this time and tolerating well.

## 2019-11-10 NOTE — ED PROVIDER NOTE - CHPI ED SYMPTOMS NEG
no body aches/no chest pain/no cough/no edema/no headache/no chills/no hemoptysis/no fever/no diaphoresis/no wheezing

## 2019-11-10 NOTE — ED ADULT TRIAGE NOTE - CHIEF COMPLAINT QUOTE
Pt BIBA with c/o sob and chest pressure. On hospice care for prostate CA with mets Pt BIBA with c/o sob and chest pressure. On hospice care for prostate CA with mets.  Given 162 ASA from EMS

## 2019-11-10 NOTE — ED ADULT NURSE NOTE - CHPI ED NUR SYMPTOMS NEG
no chills/no edema/no fever/no headache/no diaphoresis/no cough/no body aches/no wheezing/no chest pain/no hemoptysis

## 2019-11-10 NOTE — ED PROVIDER NOTE - NSREASONFORTRANSFER_ED_A_ED
Higher Level of Care or Service Not Available/Consultant Request/Patient Request/Pre-existing Relationship/PMD Request/No Available Appropriate Bed at this Facility

## 2019-11-10 NOTE — ED PROVIDER NOTE - NSPREEXISTRELATION_ED_A_ED_FT
Patient is well known at [facility] (Dr. Nina Blachman) as well as [facility] for inpatient hospice admission. See progress note. Ambulance sent for patient by Dr. Gauthier

## 2019-11-10 NOTE — ED ADULT NURSE NOTE - CHIEF COMPLAINT QUOTE
Pt BIBA with c/o sob and chest pressure. On hospice care for prostate CA with mets.  Given 162 ASA from EMS

## 2019-11-10 NOTE — ED PROVIDER NOTE - CRITICAL CARE PROVIDED
direct patient care (not related to procedure)/consultation with other physicians/interpretation of diagnostic studies/documentation/consult w/ pt's family directly relating to pts condition/conducted a detailed discussion of DNR status/telephone consultation with the patient's family/additional history taking

## 2019-11-10 NOTE — ED PROVIDER NOTE - CLINICAL SUMMARY MEDICAL DECISION MAKING FREE TEXT BOX
Ill appearing patient at end-of-life with confirmed DNR/DNI status by son at bedside, patient himself, and [place] [facility] where patient receives his care. Patient has a visiting home hospice established and is here mainly for pain control as per patient. Patient refusing opioid or narcotics, especially morphine. Worked up for PE, ACS and will recommend admission for increased cancer burden on patient.

## 2019-11-10 NOTE — ED ADULT NURSE NOTE - NSIMPLEMENTINTERV_GEN_ALL_ED
Implemented All Fall with Harm Risk Interventions:  [place] to call system. Call bell, personal items and telephone within reach. Instruct patient to call for assistance. Room bathroom lighting operational. Non-slip footwear when patient is off stretcher. Physically safe environment: no spills, clutter or unnecessary equipment. Stretcher in lowest position, wheels locked, appropriate side rails in place. Provide visual cue, wrist band, yellow gown, etc. Monitor gait and stability. Monitor for mental status changes and reorient to person, place, and time. Review medications for side effects contributing to fall risk. Reinforce activity limits and safety measures with patient and family. Provide visual clues: red socks.

## 2019-11-11 PROBLEM — Z97.0 PRESENCE OF ARTIFICIAL EYE: Chronic | Status: ACTIVE | Noted: 2019-07-13

## 2019-11-11 PROBLEM — I10 ESSENTIAL (PRIMARY) HYPERTENSION: Chronic | Status: ACTIVE | Noted: 2019-07-13

## 2019-11-11 PROBLEM — E11.9 TYPE 2 DIABETES MELLITUS WITHOUT COMPLICATIONS: Chronic | Status: ACTIVE | Noted: 2019-07-13

## 2019-11-11 PROBLEM — E78.5 HYPERLIPIDEMIA, UNSPECIFIED: Chronic | Status: ACTIVE | Noted: 2019-07-13

## 2019-11-11 LAB — CA-I BLD-SCNC: 1.22 MMOL/L — SIGNIFICANT CHANGE UP (ref 1.12–1.3)

## 2019-11-15 DIAGNOSIS — R06.00 DYSPNEA, UNSPECIFIED: ICD-10-CM

## 2019-11-15 DIAGNOSIS — Z79.84 LONG TERM (CURRENT) USE OF ORAL HYPOGLYCEMIC DRUGS: ICD-10-CM

## 2019-11-15 DIAGNOSIS — R06.02 SHORTNESS OF BREATH: ICD-10-CM

## 2019-11-15 DIAGNOSIS — E78.5 HYPERLIPIDEMIA, UNSPECIFIED: ICD-10-CM

## 2019-11-15 DIAGNOSIS — Z79.899 OTHER LONG TERM (CURRENT) DRUG THERAPY: ICD-10-CM

## 2019-11-15 DIAGNOSIS — I10 ESSENTIAL (PRIMARY) HYPERTENSION: ICD-10-CM

## 2019-11-15 DIAGNOSIS — E11.9 TYPE 2 DIABETES MELLITUS WITHOUT COMPLICATIONS: ICD-10-CM

## 2020-03-01 NOTE — ED ADULT NURSE NOTE - THOUGHTS OF SUICIDE/SELF-HARM YN, MLM
No
I personally performed the service described in the documentation recorded by the scribe in my presence, and it accurately and completely records my words and actions.

## 2022-08-04 NOTE — ED ADULT NURSE NOTE - SUICIDE SCREENING DEPRESSION
Assessment and plan 1  Health maintenance annual wellness examination overall the patient is clinically stable and doing well, we encouraged the patient to follow a healthy and balanced diet  We recommend that the patient exercise routinely approximately 30 minutes 5 times per week   We have reviewed the patient's vaccines and have made recommendations for updates if necessary   annual flu shot     We will be ordering screening laboratories which are age appropriate  Return to the office in  6 months    call if any problems  Negative

## 2024-06-17 NOTE — DISCHARGE NOTE PROVIDER - NSDCQMERRANDS_GEN_ALL_CORE
Detail Level: Simple Yes Comment: Lesion of concern Render Risk Assessment In Note?: no Comment: Lesions of concern Comment: I discussed the importance of routine skin checks due to her history skin cancer. Was recommended to schedule a skin check in one month, pt declined. Comment: Including lesions of concern

## 2024-09-23 NOTE — ED PROVIDER NOTE - NS ED MD DISPO ADMITTING SERVICE
BP Readings from Last 3 Encounters:   09/23/24 130/72   08/14/24 110/80   04/16/24 126/92       Blood pressure stable.  Continue lisinopril 40 mg daily.  Continue amlodipine 5 mg daily.  This was decreased to 5 mg by nephrology due to concern for proteinuria.  If proteinuria continues to elevate switch to spironolactone in addition to lisinopril    Orders:    CBC and Platelet; Future     MEDICINE

## 2024-09-26 NOTE — CONSULT NOTE ADULT - CONSULT REASON
symptom management/GOC - EKG with TWI V1-V3 (anterior leads)  - troponin neg  - maybe 2/2 demand iso SCC  - TTE normal    Plan  - CTM

## 2025-03-30 NOTE — DISCHARGE NOTE PROVIDER - CARE PROVIDER_API CALL
3-5x/week Nicolas Grier  [address]  [address] [place] [address]  Phone: [phone]  Fax: (   )    -  Follow Up Time: 1 week